# Patient Record
Sex: FEMALE | Race: BLACK OR AFRICAN AMERICAN | NOT HISPANIC OR LATINO | URBAN - METROPOLITAN AREA
[De-identification: names, ages, dates, MRNs, and addresses within clinical notes are randomized per-mention and may not be internally consistent; named-entity substitution may affect disease eponyms.]

---

## 2017-03-08 ENCOUNTER — OUTPATIENT (OUTPATIENT)
Dept: OUTPATIENT SERVICES | Facility: HOSPITAL | Age: 32
LOS: 1 days | End: 2017-03-08

## 2017-03-08 ENCOUNTER — APPOINTMENT (OUTPATIENT)
Dept: RADIOLOGY | Facility: HOSPITAL | Age: 32
End: 2017-03-08

## 2017-03-08 DIAGNOSIS — R76.11 NONSPECIFIC REACTION TO TUBERCULIN SKIN TEST WITHOUT ACTIVE TUBERCULOSIS: ICD-10-CM

## 2017-12-29 ENCOUNTER — EMERGENCY (EMERGENCY)
Facility: HOSPITAL | Age: 32
LOS: 1 days | Discharge: HOME | End: 2017-12-29
Attending: EMERGENCY MEDICINE | Admitting: EMERGENCY MEDICINE
Payer: COMMERCIAL

## 2017-12-29 VITALS
DIASTOLIC BLOOD PRESSURE: 83 MMHG | OXYGEN SATURATION: 100 % | HEART RATE: 90 BPM | TEMPERATURE: 100 F | SYSTOLIC BLOOD PRESSURE: 124 MMHG | RESPIRATION RATE: 16 BRPM

## 2017-12-29 VITALS
HEART RATE: 102 BPM | OXYGEN SATURATION: 100 % | RESPIRATION RATE: 16 BRPM | TEMPERATURE: 99 F | DIASTOLIC BLOOD PRESSURE: 70 MMHG | SYSTOLIC BLOOD PRESSURE: 108 MMHG

## 2017-12-29 LAB
ALBUMIN SERPL ELPH-MCNC: 3.9 G/DL — SIGNIFICANT CHANGE UP (ref 3.3–5)
ALP SERPL-CCNC: 46 U/L — SIGNIFICANT CHANGE UP (ref 40–120)
ALT FLD-CCNC: 17 U/L — SIGNIFICANT CHANGE UP (ref 4–33)
APPEARANCE UR: CLEAR — SIGNIFICANT CHANGE UP
AST SERPL-CCNC: 22 U/L — SIGNIFICANT CHANGE UP (ref 4–32)
BACTERIA # UR AUTO: SIGNIFICANT CHANGE UP
BASOPHILS # BLD AUTO: 0.01 K/UL — SIGNIFICANT CHANGE UP (ref 0–0.2)
BASOPHILS NFR BLD AUTO: 0.2 % — SIGNIFICANT CHANGE UP (ref 0–2)
BILIRUB SERPL-MCNC: 0.3 MG/DL — SIGNIFICANT CHANGE UP (ref 0.2–1.2)
BILIRUB UR-MCNC: NEGATIVE — SIGNIFICANT CHANGE UP
BLOOD UR QL VISUAL: HIGH
BUN SERPL-MCNC: 12 MG/DL — SIGNIFICANT CHANGE UP (ref 7–23)
CALCIUM SERPL-MCNC: 8.2 MG/DL — LOW (ref 8.4–10.5)
CHLORIDE SERPL-SCNC: 103 MMOL/L — SIGNIFICANT CHANGE UP (ref 98–107)
CO2 SERPL-SCNC: 22 MMOL/L — SIGNIFICANT CHANGE UP (ref 22–31)
COLOR SPEC: YELLOW — SIGNIFICANT CHANGE UP
CREAT SERPL-MCNC: 0.64 MG/DL — SIGNIFICANT CHANGE UP (ref 0.5–1.3)
EOSINOPHIL # BLD AUTO: 0.01 K/UL — SIGNIFICANT CHANGE UP (ref 0–0.5)
EOSINOPHIL NFR BLD AUTO: 0.2 % — SIGNIFICANT CHANGE UP (ref 0–6)
GLUCOSE SERPL-MCNC: 103 MG/DL — HIGH (ref 70–99)
GLUCOSE UR-MCNC: NEGATIVE — SIGNIFICANT CHANGE UP
HCT VFR BLD CALC: 40.2 % — SIGNIFICANT CHANGE UP (ref 34.5–45)
HGB BLD-MCNC: 13 G/DL — SIGNIFICANT CHANGE UP (ref 11.5–15.5)
IMM GRANULOCYTES # BLD AUTO: 0.02 # — SIGNIFICANT CHANGE UP
IMM GRANULOCYTES NFR BLD AUTO: 0.3 % — SIGNIFICANT CHANGE UP (ref 0–1.5)
KETONES UR-MCNC: NEGATIVE — SIGNIFICANT CHANGE UP
LEUKOCYTE ESTERASE UR-ACNC: NEGATIVE — SIGNIFICANT CHANGE UP
LYMPHOCYTES # BLD AUTO: 0.81 K/UL — LOW (ref 1–3.3)
LYMPHOCYTES # BLD AUTO: 14.2 % — SIGNIFICANT CHANGE UP (ref 13–44)
MCHC RBC-ENTMCNC: 26.6 PG — LOW (ref 27–34)
MCHC RBC-ENTMCNC: 32.3 % — SIGNIFICANT CHANGE UP (ref 32–36)
MCV RBC AUTO: 82.2 FL — SIGNIFICANT CHANGE UP (ref 80–100)
MONOCYTES # BLD AUTO: 0.63 K/UL — SIGNIFICANT CHANGE UP (ref 0–0.9)
MONOCYTES NFR BLD AUTO: 11 % — SIGNIFICANT CHANGE UP (ref 2–14)
MUCOUS THREADS # UR AUTO: SIGNIFICANT CHANGE UP
NEUTROPHILS # BLD AUTO: 4.24 K/UL — SIGNIFICANT CHANGE UP (ref 1.8–7.4)
NEUTROPHILS NFR BLD AUTO: 74.1 % — SIGNIFICANT CHANGE UP (ref 43–77)
NITRITE UR-MCNC: NEGATIVE — SIGNIFICANT CHANGE UP
NON-SQ EPI CELLS # UR AUTO: <1 — SIGNIFICANT CHANGE UP
NRBC # FLD: 0 — SIGNIFICANT CHANGE UP
PH UR: 6 — SIGNIFICANT CHANGE UP (ref 4.6–8)
PLATELET # BLD AUTO: 203 K/UL — SIGNIFICANT CHANGE UP (ref 150–400)
PMV BLD: 12.2 FL — SIGNIFICANT CHANGE UP (ref 7–13)
POTASSIUM SERPL-MCNC: 4.4 MMOL/L — SIGNIFICANT CHANGE UP (ref 3.5–5.3)
POTASSIUM SERPL-SCNC: 4.4 MMOL/L — SIGNIFICANT CHANGE UP (ref 3.5–5.3)
PROT SERPL-MCNC: 7.1 G/DL — SIGNIFICANT CHANGE UP (ref 6–8.3)
PROT UR-MCNC: 20 MG/DL — SIGNIFICANT CHANGE UP
RBC # BLD: 4.89 M/UL — SIGNIFICANT CHANGE UP (ref 3.8–5.2)
RBC # FLD: 13.8 % — SIGNIFICANT CHANGE UP (ref 10.3–14.5)
RBC CASTS # UR COMP ASSIST: SIGNIFICANT CHANGE UP (ref 0–?)
SODIUM SERPL-SCNC: 139 MMOL/L — SIGNIFICANT CHANGE UP (ref 135–145)
SP GR SPEC: 1.02 — SIGNIFICANT CHANGE UP (ref 1–1.04)
SQUAMOUS # UR AUTO: SIGNIFICANT CHANGE UP
UROBILINOGEN FLD QL: NORMAL MG/DL — SIGNIFICANT CHANGE UP
WBC # BLD: 5.72 K/UL — SIGNIFICANT CHANGE UP (ref 3.8–10.5)
WBC # FLD AUTO: 5.72 K/UL — SIGNIFICANT CHANGE UP (ref 3.8–10.5)
WBC UR QL: SIGNIFICANT CHANGE UP (ref 0–?)

## 2017-12-29 PROCEDURE — 71020: CPT | Mod: 26

## 2017-12-29 PROCEDURE — 99284 EMERGENCY DEPT VISIT MOD MDM: CPT

## 2017-12-29 RX ORDER — KETOROLAC TROMETHAMINE 30 MG/ML
30 SYRINGE (ML) INJECTION ONCE
Qty: 0 | Refills: 0 | Status: DISCONTINUED | OUTPATIENT
Start: 2017-12-29 | End: 2017-12-29

## 2017-12-29 RX ORDER — ONDANSETRON 8 MG/1
4 TABLET, FILM COATED ORAL ONCE
Qty: 0 | Refills: 0 | Status: COMPLETED | OUTPATIENT
Start: 2017-12-29 | End: 2017-12-29

## 2017-12-29 RX ORDER — ACETAMINOPHEN 500 MG
1000 TABLET ORAL ONCE
Qty: 0 | Refills: 0 | Status: COMPLETED | OUTPATIENT
Start: 2017-12-29 | End: 2017-12-29

## 2017-12-29 RX ORDER — SODIUM CHLORIDE 9 MG/ML
1000 INJECTION INTRAMUSCULAR; INTRAVENOUS; SUBCUTANEOUS ONCE
Qty: 0 | Refills: 0 | Status: COMPLETED | OUTPATIENT
Start: 2017-12-29 | End: 2017-12-29

## 2017-12-29 RX ORDER — METOCLOPRAMIDE HCL 10 MG
10 TABLET ORAL ONCE
Qty: 0 | Refills: 0 | Status: COMPLETED | OUTPATIENT
Start: 2017-12-29 | End: 2017-12-29

## 2017-12-29 RX ADMIN — Medication 30 MILLIGRAM(S): at 16:27

## 2017-12-29 RX ADMIN — Medication 10 MILLIGRAM(S): at 18:20

## 2017-12-29 RX ADMIN — ONDANSETRON 4 MILLIGRAM(S): 8 TABLET, FILM COATED ORAL at 16:27

## 2017-12-29 RX ADMIN — SODIUM CHLORIDE 1000 MILLILITER(S): 9 INJECTION INTRAMUSCULAR; INTRAVENOUS; SUBCUTANEOUS at 18:31

## 2017-12-29 RX ADMIN — Medication 400 MILLIGRAM(S): at 18:31

## 2017-12-29 RX ADMIN — SODIUM CHLORIDE 1000 MILLILITER(S): 9 INJECTION INTRAMUSCULAR; INTRAVENOUS; SUBCUTANEOUS at 16:27

## 2017-12-29 NOTE — ED PROVIDER NOTE - PLAN OF CARE
Follow up with your PMD within 48-72 hours.  Rest, increase fluids. Take tylenol 650mg every 6 hours, ibuprofen 600mg every 8hrs for pain or temp greater than 99.9. Worsening or continued fever, chills, weakness, nausea, vomiting, abdominal pain return to ER

## 2017-12-29 NOTE — ED PROVIDER NOTE - OBJECTIVE STATEMENT
Pt is 37 y/o female, obese, with no significant PMhx here for eval of subjective fever, sore throat, cough productive of brown sputum, generalized malaise x 3 days. Also frontal HA and pain to top of head which is typical of HA pt experienced in the past. Pt denies neck pain/stifnness, denies drooling, trismus, saray Pt is 37 y/o female, obese, with no significant PMhx here for eval of subjective fever, sore throat, cough productive of brown sputum, generalized malaise x 3 days. Also frontal HA and pain to top of head which is typical of HA pt experienced in the past. Pt denies neck pain/stifnness, denies drooling, trismus, change in voice, denies cp, sob, abd pain, admits to nausea and few episodes of NB/NB emesis most posttusively. (-) rash, sick contacts or recent travel. no meds talen today. LMP - 2 wks ago; denies urinary sx or possibility of pregnancy.

## 2017-12-29 NOTE — ED PROVIDER NOTE - MEDICAL DECISION MAKING DETAILS
viral syndrome/pharyngitis. Pt well appearing, no meningeal signs, mild posterior pharyngeal erythema anoted, lungs cta, s1s2 appreciated, abd soft, nt/nd - sx tx, ucg, cxr will reasses.

## 2017-12-29 NOTE — ED ADULT NURSE NOTE - OBJECTIVE STATEMENT
32 year old female. A&Ox3, ambulatory c/o fever, chills, cough, dysuria x 3 days, admits to N/V x 1 day. Respirations even, unlabored. IV placed, labs sent, medicated as ordered.

## 2017-12-29 NOTE — ED PROVIDER NOTE - PHYSICAL EXAMINATION
(+) mild posterior pharyngeal erythema with one exudate to Lt tonsil; no tonsillar enlargement, no evidnece of PTA.

## 2017-12-29 NOTE — ED PROVIDER NOTE - CARE PLAN
Principal Discharge DX:	Viral syndrome Principal Discharge DX:	Viral syndrome  Instructions for follow-up, activity and diet:	Follow up with your PMD within 48-72 hours.  Rest, increase fluids. Take tylenol 650mg every 6 hours, ibuprofen 600mg every 8hrs for pain or temp greater than 99.9. Worsening or continued fever, chills, weakness, nausea, vomiting, abdominal pain return to ER

## 2017-12-29 NOTE — ED PROVIDER NOTE - ATTENDING CONTRIBUTION TO CARE
DR. AYALA, ATTENDING MD-  I performed a face to face bedside interview with patient regarding history of present illness, review of symptoms and past medical history. I completed an independent physical exam.  I have discussed patient's plan of care with PA.   Documentation as above in the note.    33 y/o female with cough, muscle aches, headaches, sore throat, nausea, subj fever since last night.  No sick contacts, no recent travel.  HA similar to prior ha's in character.  Urine seems darker and cloudier than usual.  Denies neck stiffness, cp, sob, abd pain, v/d, dysuria, rash.  Appears fatigued, dry mm, mild pharyngeal erythema, no tonsillar exudates, uvula midline, full rom neck, no meningismus, mild photophobia, diminished throughout otherwise ctabil, s1s2 rrr no m/r/g, abd soft non ttp no r/g, no cva tenderness b/l, no leg swelling b/l, no rash.  Likely viral syndrome, consider pna, give ivf, toradol, obtain cxr, cbc, bmp, ucg, ua, ucx, reassess, antic dc with pcp f/u.

## 2017-12-31 LAB
BACTERIA UR CULT: SIGNIFICANT CHANGE UP
SPECIMEN SOURCE: SIGNIFICANT CHANGE UP

## 2018-12-05 NOTE — ED ADULT TRIAGE NOTE - NS ED TRIAGE AVPU SCALE
Alert-The patient is alert, awake and responds to voice. The patient is oriented to time, place, and person. The triage nurse is able to obtain subjective information. Decreased range of motion on left shoulder joint due to pain noted.

## 2020-04-26 ENCOUNTER — MESSAGE (OUTPATIENT)
Age: 35
End: 2020-04-26

## 2020-05-02 LAB
SARS-COV-2 IGG SERPL IA-ACNC: <0.1 INDEX
SARS-COV-2 IGG SERPL QL IA: NEGATIVE

## 2021-06-28 ENCOUNTER — APPOINTMENT (OUTPATIENT)
Dept: BARIATRICS | Facility: CLINIC | Age: 36
End: 2021-06-28
Payer: COMMERCIAL

## 2021-06-28 VITALS — BODY MASS INDEX: 51.16 KG/M2 | HEIGHT: 62 IN | WEIGHT: 278 LBS

## 2021-06-28 DIAGNOSIS — H93.8X3 OTHER SPECIFIED DISORDERS OF EAR, BILATERAL: ICD-10-CM

## 2021-06-28 DIAGNOSIS — Z78.9 OTHER SPECIFIED HEALTH STATUS: ICD-10-CM

## 2021-06-28 DIAGNOSIS — H69.83 OTHER SPECIFIED DISORDERS OF EUSTACHIAN TUBE, BILATERAL: ICD-10-CM

## 2021-06-28 PROCEDURE — 99205 OFFICE O/P NEW HI 60 MIN: CPT | Mod: 95

## 2021-06-28 NOTE — HISTORY OF PRESENT ILLNESS
[Improved Looks/Aesthetics] : Improved looks/aesthetics [Childhood] : childhood [Commerial Program: _____] : commercial program: [unfilled] [Cravings] : cravings [Portions/overeating] : portions/overeating [Having a specific meal plan to follow] : having a specific meal plan to follow [Other: (explain) _____] : [unfilled] [] : Yes [I usually sleep 6-8 hours] : I usually sleep 6-8 hours [Breakfast] : breakfast [Lunch] : lunch [Dinner] : dinner [Sweet] : sweet [5] : How many cups of water do you regularly drink per day: 5 [2] : Cups of coffee per day: 2 [Other: ____] : [unfilled] [0] : 0 [Other Location: e.g. School (Enter Location, City,State)___] : at [unfilled], at the time of the visit. [Other Location: e.g. Home (Enter Location, City,State)___] : at [unfilled] [Family Member] : family member [Verbal consent obtained from patient] : the patient, [unfilled] [FreeTextEntry2] : 278 [FreeTextEntry1] : 36 y/o female, has been struggling with her weight since she was a child \par [place] about our weight loss management program from a friend\par Reports struggling with irregular/ heavy menstrual cycles, not currently on birth control , not currently sexually active. ? 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

## 2021-06-28 NOTE — ASSESSMENT
[FreeTextEntry1] : Lifestyle modification education provided- recommend diet high in lean protein & vegetables, low in simple carbohydrates. Discussed complex carbs that should be incorporated in diet. Water intake goal of 64 ounces daily.\par Referral to RD for additional dietary education.  \par Sleep- recommend 7-8 hours of sleep to help with weight loss\par Exercise: recommend exercising at the gym for 30-45 mins 3-4 times per week. Recommend using the bike or doing chair exercises to decrease incidence of pain in feet\par Medication- pt meets criteria to initiate medication for weight loss. Discuss different types of medications and how they work. Pt to call insurance company to see if she has insurance coverage. \par Ordered fasting lab work to evaluate for any weight related diseases. \par RTO in 2 weeks for frequent follow up and to review results of labs. Scheduled an appt for 7/12 at 10:30 am

## 2021-07-12 ENCOUNTER — APPOINTMENT (OUTPATIENT)
Dept: BARIATRICS | Facility: CLINIC | Age: 36
End: 2021-07-12
Payer: COMMERCIAL

## 2021-07-12 LAB
25(OH)D3 SERPL-MCNC: 25.9 NG/ML
ALBUMIN SERPL ELPH-MCNC: 4.3 G/DL
ALP BLD-CCNC: 50 U/L
ALT SERPL-CCNC: 27 U/L
ANION GAP SERPL CALC-SCNC: 14 MMOL/L
AST SERPL-CCNC: 27 U/L
BASOPHILS # BLD AUTO: 0.02 K/UL
BASOPHILS NFR BLD AUTO: 0.2 %
BILIRUB SERPL-MCNC: 0.3 MG/DL
BUN SERPL-MCNC: 14 MG/DL
CALCIUM SERPL-MCNC: 9.4 MG/DL
CHLORIDE SERPL-SCNC: 105 MMOL/L
CHOLEST SERPL-MCNC: 204 MG/DL
CO2 SERPL-SCNC: 19 MMOL/L
CORTIS SERPL-MCNC: 0.4 UG/DL
CREAT SERPL-MCNC: 0.58 MG/DL
CRP SERPL-MCNC: <3 MG/L
EOSINOPHIL # BLD AUTO: 0.05 K/UL
EOSINOPHIL NFR BLD AUTO: 0.5 %
ESTIMATED AVERAGE GLUCOSE: 114 MG/DL
FSH SERPL-MCNC: 3.1 IU/L
GLUCOSE SERPL-MCNC: 103 MG/DL
HBA1C MFR BLD HPLC: 5.6 %
HCT VFR BLD CALC: 40.5 %
HDLC SERPL-MCNC: 72 MG/DL
HGB BLD-MCNC: 13.2 G/DL
IMM GRANULOCYTES NFR BLD AUTO: 0.4 %
INSULIN P FAST SERPL-ACNC: 40.2 UU/ML
LDLC SERPL CALC-MCNC: 118 MG/DL
LH SERPL-ACNC: 6.2 IU/L
LYMPHOCYTES # BLD AUTO: 1.87 K/UL
LYMPHOCYTES NFR BLD AUTO: 19.5 %
MAN DIFF?: NORMAL
MCHC RBC-ENTMCNC: 27.3 PG
MCHC RBC-ENTMCNC: 32.6 GM/DL
MCV RBC AUTO: 83.7 FL
MONOCYTES # BLD AUTO: 0.41 K/UL
MONOCYTES NFR BLD AUTO: 4.3 %
NEUTROPHILS # BLD AUTO: 7.21 K/UL
NEUTROPHILS NFR BLD AUTO: 75.1 %
NONHDLC SERPL-MCNC: 131 MG/DL
PLATELET # BLD AUTO: 294 K/UL
POTASSIUM SERPL-SCNC: 4.3 MMOL/L
PROT SERPL-MCNC: 6.8 G/DL
RBC # BLD: 4.84 M/UL
RBC # FLD: 14.3 %
SODIUM SERPL-SCNC: 138 MMOL/L
T3 SERPL-MCNC: 78 NG/DL
T4 FREE SERPL-MCNC: 1 NG/DL
TESTOST BND SERPL-MCNC: 1.2 PG/ML
TESTOSTERONE TOTAL S: 19 NG/DL
TRIGL SERPL-MCNC: 69 MG/DL
TSH SERPL-ACNC: 1.31 UIU/ML
URATE SERPL-MCNC: 5.9 MG/DL
WBC # FLD AUTO: 9.6 K/UL

## 2021-07-12 PROCEDURE — 99214 OFFICE O/P EST MOD 30 MIN: CPT | Mod: 95

## 2021-07-12 RX ORDER — DEXAMETHASONE 1 MG/1
1 TABLET ORAL
Qty: 1 | Refills: 0 | Status: DISCONTINUED | COMMUNITY
Start: 2021-06-28 | End: 2021-07-12

## 2021-07-12 NOTE — ASSESSMENT
[FreeTextEntry1] : Dietary modifcation education reviewed. Recommend low fat diet, discussed healthy fats vs unhealthy fats. Avoid fried foods, red meat, pizza, etc. Recommend substituting complex carbs instead of simple carbs. \par Recommend increasing exercise regimen to 3 times per week \par Discussed option to treat obesity with Contrave. Pt denies contraindications to contrave including: glaucoma, uncontrolled hypertension, seizure disorder, opiod/ETOH use, MAOI use, anorexia/bulimia, CHF, MI, Kidney/liver disease, & allergy to contrave. Reviewed common side effects including: N/C/V/D, headache, dizziness, insomnia, and dry mouth. Reviewed possible adverse effects including: seizures, risk of opiod overdose, sudden allergic reactions, elevated blood pressure or heart rate, hepatitis, manic episodes, angle closure glaucoma, hypoglycemia if taken with insulin or sulfonylureas. Discussed titration schedule for contrave: start with 1 pill daily & increase by 1 pill weekly until you reach maximum dose of 4 tabs daily. Instructed to avoid taking with high fat meals. Will send in via mail order to [facility] since the patient is paying out of pocket. \par RTO in 2 weeks - 8/2 at 10:30 am \par

## 2021-07-12 NOTE — HISTORY OF PRESENT ILLNESS
[Home] : at home, [unfilled] , at the time of the visit. [Other Location: e.g. Home (Enter Location, City,State)___] : at [unfilled] [Verbal consent obtained from patient] : the patient, [unfilled] [FreeTextEntry1] : 36 y/o female, has been struggling with her weight since she was a child \par [place] about our weight loss management program from a friend\par Reports struggling with irregular/ heavy menstrual cycles, not currently on birth control , not currently sexually active. ? 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 Reviewed lab results- elevated cholesterol, elevated LDL, elevated fasting insulin, HgBa1c 5.6%, Vit D slightly low. Called insurance- does not have coverage for weight loss medication. Pt is interested in self paying for contrave to help with cravings. Exercised at the gym 2 times last week on the bike x 30 mins.

## 2021-08-02 ENCOUNTER — APPOINTMENT (OUTPATIENT)
Dept: BARIATRICS | Facility: CLINIC | Age: 36
End: 2021-08-02

## 2021-08-26 ENCOUNTER — APPOINTMENT (OUTPATIENT)
Dept: BARIATRICS | Facility: CLINIC | Age: 36
End: 2021-08-26
Payer: COMMERCIAL

## 2021-08-26 VITALS — HEIGHT: 62 IN | WEIGHT: 267.5 LBS | BODY MASS INDEX: 49.23 KG/M2

## 2021-08-26 PROCEDURE — 99214 OFFICE O/P EST MOD 30 MIN: CPT | Mod: 95

## 2021-08-26 NOTE — HISTORY OF PRESENT ILLNESS
[Home] : at home, [unfilled] , at the time of the visit. [Other Location: e.g. Home (Enter Location, City,State)___] : at [unfilled] [Verbal consent obtained from patient] : the patient, [unfilled] [] : 2. Do you feel distressed about your episodes of excessive overeating? Yes [Always] : 6. Within the past 3 months, during your episodes of excessive overeating, how often did you feel disgusted with yourself or guilty afterward? Always [Never or Rarely] : 7. Within the past 3 months, during your episodes of excessive overeating, how often did you make yourself vomit as a means to control your weight or shape? Never or Rarely [FreeTextEntry1] : 36 y/o female, has been struggling with her weight since she was a child \par [place] about our weight loss management program from a friend\par Reports struggling with irregular/ heavy menstrual cycles, not currently on birth control , not currently sexually active. ? 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 Reviewed lab results- elevated cholesterol, elevated LDL, elevated fasting insulin, HgBa1c 5.6%, Vit D slightly low. Called insurance- does not have coverage for weight loss medication. Pt is interested in self paying for contrave to help with cravings. Exercised at the gym 2 times last week on the bike x 30 mins.\par \par 8/26/21: Lost 10.5 lbs since starting the program. Dietary intake improved. Food Recall: B- oatmeal, L- salad with protein, D- rice, beans and vegetables. Taking contrave 4 tabs daily, reports that she skips on days that she is working, reports having side effects including nausea, constipation, insomnia and occassional vomiting. Reports 2-3 binge eating episodes throughout the week on days that she is working and doesn't take the medication. Binge eating episodes happen on high stress days. Not currently seeing a therapist. Exercises at the gym 1-3 times per week. Water intake adequate. Sleep inadequate.

## 2021-08-26 NOTE — ASSESSMENT
[FreeTextEntry1] : Dietary Modification- recommend changing to brown rice instead of white rice or decreasing the days that she eats white rice. Continue diet high in vegetables & lean protein intake. Recommend decreasing access to sweat treats at home to prevent excessive eating of sweats when she is stressed. \par Physical Activity/Exercise- recommend exercising 3 times per week for 45 mins. Enjoys dancing, may benefit from signing up for a gym with classes\par Mental Health- recommend calling therapist to re-initiate treatment to help with emotional eating/ coping strategies.\par Insomnia- recommend meditation & deep breathing at night\par Medication-recommend titrating off contrave, decrease to 2 tabs daily over the next week and then down to 1 tab daily the following week and then come off completely. May benefit from treatment with vyvanse to treat BED.\par RTO in 3-4 weeks- scheduled an appt for 9/23 at 2 pm \par

## 2021-09-23 ENCOUNTER — APPOINTMENT (OUTPATIENT)
Dept: BARIATRICS | Facility: CLINIC | Age: 36
End: 2021-09-23
Payer: COMMERCIAL

## 2021-09-23 VITALS — BODY MASS INDEX: 49.69 KG/M2 | HEIGHT: 62 IN | WEIGHT: 270 LBS

## 2021-09-23 DIAGNOSIS — Z00.00 ENCOUNTER FOR GENERAL ADULT MEDICAL EXAMINATION W/OUT ABNORMAL FINDINGS: ICD-10-CM

## 2021-09-23 PROCEDURE — 99214 OFFICE O/P EST MOD 30 MIN: CPT | Mod: 95

## 2021-09-23 NOTE — ASSESSMENT
[FreeTextEntry1] : Dietary Modification- recommend meal planning for work days and to pack her own lunch to avoid periods of feeling hungry and to avoid eating available snacks. Given examples of healthy meals to pack for lunch. recommend mindful eating, trying distraction techniques to avoid binge eating episodes.\par Mental Health- recommend calling therapist to re-initiate therapy session to assist with coping mechanisms\par Exercise- continue current exercise regimen\par Medication- meets requirement to start medication treatment of obesity & BED. Will start on vyvanse daily. Reviewed side effects- headaches, increased anxiety, increased HR & BP, insomnia, n/v/c/d. Recommend she checks BP & HR weekly. \par RTO in 2 weeks- 1month \par \par

## 2021-09-23 NOTE — HISTORY OF PRESENT ILLNESS
[Home] : at home, [unfilled] , at the time of the visit. [Other Location: e.g. Home (Enter Location, City,State)___] : at [unfilled] [Verbal consent obtained from patient] : the patient, [unfilled] [FreeTextEntry1] : 34 y/o female, has been struggling with her weight since she was a child \par [place] about our weight loss management program from a friend\par Reports struggling with irregular/ heavy menstrual cycles, not currently on birth control , not currently sexually active. ? 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 Reviewed lab results- elevated cholesterol, elevated LDL, elevated fasting insulin, HgBa1c 5.6%, Vit D slightly low. Called insurance- does not have coverage for weight loss medication. Pt is interested in self paying for contrave to help with cravings. Exercised at the gym 2 times last week on the bike x 30 mins.\par \par 8/26/21: Lost 10.5 lbs since starting the program. Dietary intake improved. Food Recall: B- oatmeal, L- salad with protein, D- rice, beans and vegetables. Taking contrave 4 tabs daily, reports that she skips on days that she is working, reports having side effects including nausea, constipation, insomnia and occassional vomiting. Reports 2-3 binge eating episodes throughout the week on days that she is working and doesn't take the medication. Binge eating episodes happen on high stress days. Not currently seeing a therapist. Exercises at the gym 1-3 times per week. Water intake adequate. Sleep inadequate. \par \par 9/23/21: Gained 3 lbs since tapering off of contrave. Reports resolution of side effects including-nausea,constipation and insomnia. Has been sleeping better. Water intake adequate. Able to eat well on days off- ex: B-oatmeal, L-yogurt, D-protein with vegetable. Has binge eating episodes 3-4 times per week on days that she is working- usually binges on sweets. Exercising 3-4 times per week- youtube vidoes, yoga & pilated. Denies calling therapist.

## 2021-09-28 RX ORDER — LISDEXAMFETAMINE DIMESYLATE 30 MG/1
30 CAPSULE ORAL
Qty: 30 | Refills: 0 | Status: DISCONTINUED | COMMUNITY
Start: 2021-09-23 | End: 2021-09-28

## 2022-08-19 NOTE — ED PROVIDER NOTE - RESPIRATORY DISTRESS
SURVEY:    You may be receiving a survey from [employer] regarding your visit today. Please complete the survey to enable us to provide the highest quality of care to you and your family. If you cannot score us a very good (5 Stars) on any question, please call the office to discuss how we could have made your experience a very good one. Thank you.     Clinical Care Team: Oleg Nunez, MELODY-YASSINE Whitley LPN    Clerical Team: Cintia Serrato
no

## 2023-03-07 ENCOUNTER — APPOINTMENT (OUTPATIENT)
Dept: BARIATRICS/WEIGHT MGMT | Facility: CLINIC | Age: 38
End: 2023-03-07
Payer: COMMERCIAL

## 2023-03-07 VITALS — WEIGHT: 293 LBS | BODY MASS INDEX: 53.59 KG/M2

## 2023-03-07 VITALS — SYSTOLIC BLOOD PRESSURE: 110 MMHG | DIASTOLIC BLOOD PRESSURE: 70 MMHG

## 2023-03-07 PROCEDURE — 99214 OFFICE O/P EST MOD 30 MIN: CPT

## 2023-03-07 RX ORDER — LISDEXAMFETAMINE DIMESYLATE 30 MG/1
30 CAPSULE ORAL
Qty: 30 | Refills: 0 | Status: DISCONTINUED | COMMUNITY
Start: 2021-09-28 | End: 2023-03-07

## 2023-03-07 RX ORDER — NALTREXONE HYDROCHLORIDE AND BUPROPION HYDROCHLORIDE 8; 90 MG/1; MG/1
8-90 TABLET, EXTENDED RELEASE ORAL
Qty: 120 | Refills: 2 | Status: DISCONTINUED | COMMUNITY
Start: 2021-07-12 | End: 2023-03-07

## 2023-03-07 NOTE — HISTORY OF PRESENT ILLNESS
[FreeTextEntry1] : 34 y/o female, has been struggling with her weight since she was a child \par [place] about our weight loss management program from a friend\par Reports struggling with irregular/ heavy menstrual cycles, not currently on birth control , not currently sexually active. ? 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 Reviewed lab results- elevated cholesterol, elevated LDL, elevated fasting insulin, HgBa1c 5.6%, Vit D slightly low. Called insurance- does not have coverage for weight loss medication. Pt is interested in self paying for contrave to help with cravings. Exercised at the gym 2 times last week on the bike x 30 mins.\par \par 8/26/21: Lost 10.5 lbs since starting the program. Dietary intake improved. Food Recall: B- oatmeal, L- salad with protein, D- rice, beans and vegetables. Taking contrave 4 tabs daily, reports that she skips on days that she is working, reports having side effects including nausea, constipation, insomnia and occassional vomiting. Reports 2-3 binge eating episodes throughout the week on days that she is working and doesn't take the medication. Binge eating episodes happen on high stress days. Not currently seeing a therapist. Exercises at the gym 1-3 times per week. Water intake adequate. Sleep inadequate. \par \par 9/23/21: Gained 3 lbs since tapering off of contrave. Reports resolution of side effects including-nausea,constipation and insomnia. Has been sleeping better. Water intake adequate. Able to eat well on days off- ex: B-oatmeal, L-yogurt, D-protein with vegetable. Has binge eating episodes 3-4 times per week on days that she is working- usually binges on sweets. Exercising 3-4 times per week- youtube vidoes, yoga & pilated. Denies calling therapist.  \par \par 3/7/23: Gained weight since we last spoke, highest weight 305, has lost 10 lbs from dietary changes in the last 2 weeks. Continues to struggle with binge eating episodes due to emotional stress. Tried vyvanse and said it didn’t help.

## 2023-03-07 NOTE — HISTORY OF PRESENT ILLNESS
[FreeTextEntry1] : 36 y/o female, has been struggling with her weight since she was a child \par [place] about our weight loss management program from a friend\par Reports struggling with irregular/ heavy menstrual cycles, not currently on birth control , not currently sexually active. ? 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 Reviewed lab results- elevated cholesterol, elevated LDL, elevated fasting insulin, HgBa1c 5.6%, Vit D slightly low. Called insurance- does not have coverage for weight loss medication. Pt is interested in self paying for contrave to help with cravings. Exercised at the gym 2 times last week on the bike x 30 mins.\par \par 8/26/21: Lost 10.5 lbs since starting the program. Dietary intake improved. Food Recall: B- oatmeal, L- salad with protein, D- rice, beans and vegetables. Taking contrave 4 tabs daily, reports that she skips on days that she is working, reports having side effects including nausea, constipation, insomnia and occassional vomiting. Reports 2-3 binge eating episodes throughout the week on days that she is working and doesn't take the medication. Binge eating episodes happen on high stress days. Not currently seeing a therapist. Exercises at the gym 1-3 times per week. Water intake adequate. Sleep inadequate. \par \par 9/23/21: Gained 3 lbs since tapering off of contrave. Reports resolution of side effects including-nausea,constipation and insomnia. Has been sleeping better. Water intake adequate. Able to eat well on days off- ex: B-oatmeal, L-yogurt, D-protein with vegetable. Has binge eating episodes 3-4 times per week on days that she is working- usually binges on sweets. Exercising 3-4 times per week- youtube vidoes, yoga & pilated. Denies calling therapist.  \par \par 3/7/23: Gained weight since we last spoke, highest weight 305, has lost 10 lbs from dietary changes in the last 2 weeks. Continues to struggle with binge eating episodes due to emotional stress. Tried vyvanse and said it didn’t help.

## 2023-03-07 NOTE — ASSESSMENT
[FreeTextEntry1] : Continue to focus on dietary modification \par Discussed the importance of focusing on the emotional eating component. Rec she see a therapist to help with this. Open to seeing someone new. Sent info for Dr. Fischer\par Medication- would recommend wegovy, but need updated labs before prescribing medication\par Labs- ordered, to be done before our next apt \par RTO in 3 weeks to review labs and to do a [employer] test at that time\par \par

## 2023-03-30 ENCOUNTER — APPOINTMENT (OUTPATIENT)
Dept: BARIATRICS/WEIGHT MGMT | Facility: CLINIC | Age: 38
End: 2023-03-30
Payer: COMMERCIAL

## 2023-03-30 VITALS — WEIGHT: 292 LBS | BODY MASS INDEX: 53.41 KG/M2

## 2023-03-30 PROCEDURE — 99214 OFFICE O/P EST MOD 30 MIN: CPT

## 2023-03-30 NOTE — ASSESSMENT
[FreeTextEntry1] : Continue to focus on dietary modification- continue to focus on eating healthier foods  \par Discussed the importance of focusing on the emotional eating component. Rec she see a therapist to help with this. Open to seeing someone new. Given info for Dr. Fischer and Phoebe Lamb \par Medication- would recommend wegovy\par Discussed the option to treat obesity with Saxenda or wegovy. Patient denies contraindications to taking: denies family hx or personal history of medullary thyroid carcinoma or MEN 2, denies history of pancreatitis/gallbladder disease/hypoglycemia/renal impairment/ suicidal ideation.  Discussed mechanism of action- works like GLP-1 by regulating appetite/ reducing hunger. Reviewed side effects including: N/V/D/C, injection site reactions, headaches, low blood sugar, dizziness, abdominal pain, and fatigue. Reviewed ways to decrease nausea including: eating bland/lowfat foods, eating foods that contain water, and avoiding lying flat after eating. Discussed injection education & titration schedule.\par Labs-  reviewed with pt \par RTO 1 month \par \par

## 2023-03-30 NOTE — HISTORY OF PRESENT ILLNESS
[FreeTextEntry1] : 34 y/o female, has been struggling with her weight since she was a child \par [place] about our weight loss management program from a friend\par Reports struggling with irregular/ heavy menstrual cycles, not currently on birth control , not currently sexually active. ? 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 Reviewed lab results- elevated cholesterol, elevated LDL, elevated fasting insulin, HgBa1c 5.6%, Vit D slightly low. Called insurance- does not have coverage for weight loss medication. Pt is interested in self paying for contrave to help with cravings. Exercised at the gym 2 times last week on the bike x 30 mins.\par \par 8/26/21: Lost 10.5 lbs since starting the program. Dietary intake improved. Food Recall: B- oatmeal, L- salad with protein, D- rice, beans and vegetables. Taking contrave 4 tabs daily, reports that she skips on days that she is working, reports having side effects including nausea, constipation, insomnia and occasional vomiting. Reports 2-3 binge eating episodes throughout the week on days that she is working and doesn't take the medication. Binge eating episodes happen on high stress days. Not currently seeing a therapist. Exercises at the gym 1-3 times per week. Water intake adequate. Sleep inadequate. \par \par 9/23/21: Gained 3 lbs since tapering off of contrave. Reports resolution of side effects including-nausea,constipation and insomnia. Has been sleeping better. Water intake adequate. Able to eat well on days off- ex: B-oatmeal, L-yogurt, D-protein with vegetable. Has binge eating episodes 3-4 times per week on days that she is working- usually binges on sweets. Exercising 3-4 times per week- youtube videos, yoga & pilates. Denies calling therapist.  \par \par 3/7/23: Gained weight since we last spoke, highest weight 305, has lost 10 lbs from dietary changes in the last 2 weeks. Continues to struggle with binge eating episodes due to emotional stress. Tried vyvanse and said it didn’t help. \par \par 3/30/23: Has been overeating healthier foods . Continues to struggle with extreme hunger, denies feeling full after she finishes a meal, and has binge eating episodes due to emotional stress. Reports that she has struggled with obesity & hunger since she was a child.

## 2023-04-07 ENCOUNTER — APPOINTMENT (OUTPATIENT)
Dept: SURGERY | Facility: CLINIC | Age: 38
End: 2023-04-07
Payer: COMMERCIAL

## 2023-04-07 VITALS
RESPIRATION RATE: 17 BRPM | HEIGHT: 62 IN | DIASTOLIC BLOOD PRESSURE: 83 MMHG | WEIGHT: 286 LBS | HEART RATE: 83 BPM | SYSTOLIC BLOOD PRESSURE: 124 MMHG | TEMPERATURE: 97.2 F | OXYGEN SATURATION: 98 % | BODY MASS INDEX: 52.63 KG/M2

## 2023-04-07 PROCEDURE — 99205 OFFICE O/P NEW HI 60 MIN: CPT

## 2023-04-07 RX ORDER — SEMAGLUTIDE 0.5 MG/.5ML
0.5 INJECTION, SOLUTION SUBCUTANEOUS
Qty: 1 | Refills: 1 | Status: DISCONTINUED | COMMUNITY
Start: 2023-03-30 | End: 2023-04-07

## 2023-04-07 NOTE — ASSESSMENT
[FreeTextEntry1] : 37-year-old female 5 foot 2 286 pounds with a BMI of 52.3 would like to be considered for sleeve gastrectomy I believe she would be a an excellent candidate she will undergo her usual medical nutritional and psychological work-up attend a preoperative support group meeting and return for second office visit once all these are completed the risk benefits and expectations were discussed at length with the patient all of her questions were answered

## 2023-04-07 NOTE — HISTORY OF PRESENT ILLNESS
[de-identified] : Caity is a 37 year old female who presents for initial bariatric surgery consultation.  37-year-old female 5 foot 2 286 pounds with a BMI of 52.3 she has been heavy most of her adult life she has been on multiple diet programs in the past losing up to 50 pounds at any 1 time always gaining that weight back she is looking for more permanent solution to her weight loss problem she is interested in a sleeve gastrectomy she denies reflux she is or previous surgeries.

## 2023-04-25 ENCOUNTER — APPOINTMENT (OUTPATIENT)
Dept: BARIATRICS | Facility: CLINIC | Age: 38
End: 2023-04-25

## 2023-05-01 ENCOUNTER — APPOINTMENT (OUTPATIENT)
Dept: SURGERY | Facility: CLINIC | Age: 38
End: 2023-05-01
Payer: COMMERCIAL

## 2023-05-01 ENCOUNTER — APPOINTMENT (OUTPATIENT)
Dept: BARIATRICS/WEIGHT MGMT | Facility: CLINIC | Age: 38
End: 2023-05-01
Payer: COMMERCIAL

## 2023-05-01 VITALS — BODY MASS INDEX: 53 KG/M2 | WEIGHT: 288 LBS | HEIGHT: 62 IN

## 2023-05-01 VITALS — WEIGHT: 288.3 LBS | BODY MASS INDEX: 52.73 KG/M2

## 2023-05-01 PROCEDURE — 99213 OFFICE O/P EST LOW 20 MIN: CPT | Mod: 95

## 2023-05-01 PROCEDURE — 99214 OFFICE O/P EST MOD 30 MIN: CPT | Mod: 95

## 2023-05-01 RX ORDER — SEMAGLUTIDE 0.25 MG/.5ML
0.25 INJECTION, SOLUTION SUBCUTANEOUS
Qty: 1 | Refills: 0 | Status: DISCONTINUED | COMMUNITY
Start: 2023-03-30 | End: 2023-05-01

## 2023-05-01 NOTE — HISTORY OF PRESENT ILLNESS
[Home] : at home, [unfilled] , at the time of the visit. [Medical Office: ([facility])___] : at the medical office located in  [Verbal consent obtained from patient] : the patient, [unfilled] [de-identified] : Caity is a 37-year-old female who presents for first weigh-in status post initial bariatric surgery consultation on April 7, 2023.

## 2023-05-01 NOTE — ASSESSMENT
[FreeTextEntry1] : Dietary- discussed healthy meal options and ways to meal prep throughout the week. \par Exercise- continue to see  as it keeps her accountable\par Medication- inc dose to wegovy 0.5 mg weekly \par RTO in 3-4 weeks.

## 2023-05-01 NOTE — HISTORY OF PRESENT ILLNESS
[Home] : at home, [unfilled] , at the time of the visit. [Other Location: e.g. Home (Enter Location, City,State)___] : at [unfilled] [Verbal consent obtained from patient] : the patient, [unfilled] [FreeTextEntry1] : 34 y/o female, has been struggling with her weight since she was a child \par [place] about our weight loss management program from a friend\par Reports struggling with irregular/ heavy menstrual cycles, not currently on birth control , not currently sexually active. ? 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 Reviewed lab results- elevated cholesterol, elevated LDL, elevated fasting insulin, HgBa1c 5.6%, Vit D slightly low. Called insurance- does not have coverage for weight loss medication. Pt is interested in self paying for contrave to help with cravings. Exercised at the gym 2 times last week on the bike x 30 mins.\par \par 8/26/21: Lost 10.5 lbs since starting the program. Dietary intake improved. Food Recall: B- oatmeal, L- salad with protein, D- rice, beans and vegetables. Taking contrave 4 tabs daily, reports that she skips on days that she is working, reports having side effects including nausea, constipation, insomnia and occasional vomiting. Reports 2-3 binge eating episodes throughout the week on days that she is working and doesn't take the medication. Binge eating episodes happen on high stress days. Not currently seeing a therapist. Exercises at the gym 1-3 times per week. Water intake adequate. Sleep inadequate. \par \par 9/23/21: Gained 3 lbs since tapering off of contrave. Reports resolution of side effects including-nausea,constipation and insomnia. Has been sleeping better. Water intake adequate. Able to eat well on days off- ex: B-oatmeal, L-yogurt, D-protein with vegetable. Has binge eating episodes 3-4 times per week on days that she is working- usually binges on sweets. Exercising 3-4 times per week- youtube videos, yoga & pilates. Denies calling therapist.  \par \par 3/7/23: Gained weight since we last spoke, highest weight 305, has lost 10 lbs from dietary changes in the last 2 weeks. Continues to struggle with binge eating episodes due to emotional stress. Tried vyvanse and said it didn’t help. \par \par 3/30/23: Has been overeating healthier foods . Continues to struggle with extreme hunger, denies feeling full after she finishes a meal, and has binge eating episodes due to emotional stress. Reports that she has struggled with obesity & hunger since she was a child. \par \par 5/1/23: Maintained weight. Tolerated wegovy 0.25 mg weekly. Ordering food out often- greek salad with grilled chicken,  or [place] food- small portion of white rice. Water intake- drinks a lot of tea and gets about 1-2L per day. Exercise- sees  2-3 times per week.

## 2023-05-22 ENCOUNTER — APPOINTMENT (OUTPATIENT)
Dept: BARIATRICS/WEIGHT MGMT | Facility: CLINIC | Age: 38
End: 2023-05-22
Payer: COMMERCIAL

## 2023-05-22 VITALS — BODY MASS INDEX: 51.53 KG/M2 | WEIGHT: 280 LBS | HEIGHT: 62 IN

## 2023-05-22 PROCEDURE — 99212 OFFICE O/P EST SF 10 MIN: CPT | Mod: 95

## 2023-05-22 NOTE — HISTORY OF PRESENT ILLNESS
[Home] : at home, [unfilled] , at the time of the visit. [Other Location: e.g. Home (Enter Location, City,State)___] : at [unfilled] [Verbal consent obtained from patient] : the patient, [unfilled] [FreeTextEntry1] : 36 y/o female, has been struggling with her weight since she was a child \par [place] about our weight loss management program from a friend\par Reports struggling with irregular/ heavy menstrual cycles, not currently on birth control , not currently sexually active. ? 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 Reviewed lab results- elevated cholesterol, elevated LDL, elevated fasting insulin, HgBa1c 5.6%, Vit D slightly low. Called insurance- does not have coverage for weight loss medication. Pt is interested in self paying for contrave to help with cravings. Exercised at the gym 2 times last week on the bike x 30 mins.\par \par 8/26/21: Lost 10.5 lbs since starting the program. Dietary intake improved. Food Recall: B- oatmeal, L- salad with protein, D- rice, beans and vegetables. Taking contrave 4 tabs daily, reports that she skips on days that she is working, reports having side effects including nausea, constipation, insomnia and occasional vomiting. Reports 2-3 binge eating episodes throughout the week on days that she is working and doesn't take the medication. Binge eating episodes happen on high stress days. Not currently seeing a therapist. Exercises at the gym 1-3 times per week. Water intake adequate. Sleep inadequate. \par \par 9/23/21: Gained 3 lbs since tapering off of contrave. Reports resolution of side effects including-nausea,constipation and insomnia. Has been sleeping better. Water intake adequate. Able to eat well on days off- ex: B-oatmeal, L-yogurt, D-protein with vegetable. Has binge eating episodes 3-4 times per week on days that she is working- usually binges on sweets. Exercising 3-4 times per week- youtube videos, yoga & pilates. Denies calling therapist.  \par \par 3/7/23: Gained weight since we last spoke, highest weight 305, has lost 10 lbs from dietary changes in the last 2 weeks. Continues to struggle with binge eating episodes due to emotional stress. Tried vyvanse and said it didn’t help. \par \par 3/30/23: Has been overeating healthier foods . Continues to struggle with extreme hunger, denies feeling full after she finishes a meal, and has binge eating episodes due to emotional stress. Reports that she has struggled with obesity & hunger since she was a child. \par \par 5/1/23: Maintained weight. Tolerated wegovy 0.25 mg weekly. Ordering food out often- greek salad with grilled chicken,  or [place] food- small portion of white rice. Water intake- drinks a lot of tea and gets about 1-2L per day. Exercise- sees  2-3 times per week. \par \par 5/22/23: Lost 8 lbs. Taking 0.5 mg weekly, feels like it is starting to work better at this dose. Feels full faster. Had a death in the family so meal planning was off track, but getting back into a routine this week. Continues to see  2-3 times per week for exercise.

## 2023-05-22 NOTE — ASSESSMENT
[FreeTextEntry1] : Continue to focus on dietary modification \par Discussed the importance of focusing on the emotional eating component. Has to reschedule a therapy session\par Medication- sent RX for 1 mg dose \par Labs- recheck in July \par RTO in 1 month- 6/26 at 11 \par \par

## 2023-06-26 ENCOUNTER — APPOINTMENT (OUTPATIENT)
Dept: BARIATRICS/WEIGHT MGMT | Facility: CLINIC | Age: 38
End: 2023-06-26
Payer: COMMERCIAL

## 2023-06-26 PROCEDURE — 99212 OFFICE O/P EST SF 10 MIN: CPT | Mod: 95

## 2023-06-26 NOTE — ASSESSMENT
[FreeTextEntry1] : Lifestyle- will refocus on meal planning and exercising when father's health improves and less stress \par Medication- inc dose to wegovy 1.7 mg weekly \par RTO in 1 month for check in- schedule a f/u apt on 7/24 at 11

## 2023-07-24 ENCOUNTER — APPOINTMENT (OUTPATIENT)
Dept: SURGERY | Facility: HOSPITAL | Age: 38
End: 2023-07-24

## 2023-07-24 ENCOUNTER — APPOINTMENT (OUTPATIENT)
Dept: BARIATRICS/WEIGHT MGMT | Facility: CLINIC | Age: 38
End: 2023-07-24

## 2023-07-24 VITALS — BODY MASS INDEX: 49.55 KG/M2 | WEIGHT: 270.9 LBS

## 2023-07-24 NOTE — ASSESSMENT
[FreeTextEntry1] : Continue to focus on dietary modification- try to refocus on meal planning. Recommend increasing dietary fiber and getting adequate water intake daily. \par Can take bowel regimen to help as well with constipation\par Medication- inc dose to wegovy 2.4 mg weekly\par RTO in 1 month\par \par

## 2023-07-24 NOTE — HISTORY OF PRESENT ILLNESS
[Other Location: e.g. School (Enter Location, City,State)___] : at [unfilled], at the time of the visit. [Other Location: e.g. Home (Enter Location, City,State)___] : at [unfilled] [FreeTextEntry1] : 34 y/o female, has been struggling with her weight since she was a child \par [place] about our weight loss management program from a friend\par Reports struggling with irregular/ heavy menstrual cycles, not currently on birth control , not currently sexually active. ? 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 Reviewed lab results- elevated cholesterol, elevated LDL, elevated fasting insulin, HgBa1c 5.6%, Vit D slightly low. Called insurance- does not have coverage for weight loss medication. Pt is interested in self paying for contrave to help with cravings. Exercised at the gym 2 times last week on the bike x 30 mins.\par \par 8/26/21: Lost 10.5 lbs since starting the program. Dietary intake improved. Food Recall: B- oatmeal, L- salad with protein, D- rice, beans and vegetables. Taking contrave 4 tabs daily, reports that she skips on days that she is working, reports having side effects including nausea, constipation, insomnia and occasional vomiting. Reports 2-3 binge eating episodes throughout the week on days that she is working and doesn't take the medication. Binge eating episodes happen on high stress days. Not currently seeing a therapist. Exercises at the gym 1-3 times per week. Water intake adequate. Sleep inadequate. \par \par 9/23/21: Gained 3 lbs since tapering off of contrave. Reports resolution of side effects including-nausea,constipation and insomnia. Has been sleeping better. Water intake adequate. Able to eat well on days off- ex: B-oatmeal, L-yogurt, D-protein with vegetable. Has binge eating episodes 3-4 times per week on days that she is working- usually binges on sweets. Exercising 3-4 times per week- youtube videos, yoga & pilates. Denies calling therapist.  \par \par 3/7/23: Gained weight since we last spoke, highest weight 305, has lost 10 lbs from dietary changes in the last 2 weeks. Continues to struggle with binge eating episodes due to emotional stress. Tried vyvanse and said it didn’t help. \par \par 3/30/23: Has been overeating healthier foods . Continues to struggle with extreme hunger, denies feeling full after she finishes a meal, and has binge eating episodes due to emotional stress. Reports that she has struggled with obesity & hunger since she was a child. \par \par 5/1/23: Maintained weight. Tolerated wegovy 0.25 mg weekly. Ordering food out often- greek salad with grilled chicken,  or [place] food- small portion of white rice. Water intake- drinks a lot of tea and gets about 1-2L per day. Exercise- sees  2-3 times per week. \par \par 5/22/23: Lost 8 lbs. Taking 0.5 mg weekly, feels like it is starting to work better at this dose. Feels full faster. Had a death in the family so meal planning was off track, but getting back into a routine this week. Continues to see  2-3 times per week for exercise. \par \par 6/26/23:  Maintained weight. Taking wegovy 1 mg dose weekly, denies side effects. Hasn't been consistent with eating or exercising since her father is currently in the hospital/ sick. \par \par 7/24/23: Requested a telephone call since she is currently at Rehab with her dad. Out of state session done today only.Reports that she spends day at work, hospital or rehab. Hasn't been able to focus on eating well or exercise since she is eating take out most days. Tolerating wegovy 1.7 mg weekly. Ready for dose titration up to 2.4 mg. Plans on getting back on track with exercise and meal planning once her dad gets out of rehab- may be discharged at the end of this week. + Constipation- taking miralax

## 2023-08-21 ENCOUNTER — APPOINTMENT (OUTPATIENT)
Dept: BARIATRICS/WEIGHT MGMT | Facility: CLINIC | Age: 38
End: 2023-08-21
Payer: COMMERCIAL

## 2023-08-21 VITALS — WEIGHT: 268 LBS | BODY MASS INDEX: 49.32 KG/M2 | HEIGHT: 62 IN

## 2023-08-21 PROCEDURE — 99212 OFFICE O/P EST SF 10 MIN: CPT | Mod: 95

## 2023-08-21 NOTE — HISTORY OF PRESENT ILLNESS
[FreeTextEntry1] : 36 y/o female, has been struggling with her weight since she was a child  [place] about our weight loss management program from a friend Reports struggling with irregular/ heavy menstrual cycles, not currently on birth control , not currently sexually active. ? 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 Reviewed lab results- elevated cholesterol, elevated LDL, elevated fasting insulin, HgBa1c 5.6%, Vit D slightly low. Called insurance- does not have coverage for weight loss medication. Pt is interested in self paying for contrave to help with cravings. Exercised at the gym 2 times last week on the bike x 30 mins.  8/26/21: Lost 10.5 lbs since starting the program. Dietary intake improved. Food Recall: B- oatmeal, L- salad with protein, D- rice, beans and vegetables. Taking contrave 4 tabs daily, reports that she skips on days that she is working, reports having side effects including nausea, constipation, insomnia and occasional vomiting. Reports 2-3 binge eating episodes throughout the week on days that she is working and doesn't take the medication. Binge eating episodes happen on high stress days. Not currently seeing a therapist. Exercises at the gym 1-3 times per week. Water intake adequate. Sleep inadequate.   9/23/21: Gained 3 lbs since tapering off of contrave. Reports resolution of side effects including-nausea,constipation and insomnia. Has been sleeping better. Water intake adequate. Able to eat well on days off- ex: B-oatmeal, L-yogurt, D-protein with vegetable. Has binge eating episodes 3-4 times per week on days that she is working- usually binges on sweets. Exercising 3-4 times per week- youtube videos, yoga & pilates. Denies calling therapist.    3/7/23: Gained weight since we last spoke, highest weight 305, has lost 10 lbs from dietary changes in the last 2 weeks. Continues to struggle with binge eating episodes due to emotional stress. Tried vyvanse and said it didn't help.   3/30/23: Has been overeating healthier foods . Continues to struggle with extreme hunger, denies feeling full after she finishes a meal, and has binge eating episodes due to emotional stress. Reports that she has struggled with obesity & hunger since she was a child.   5/1/23: Maintained weight. Tolerated wegovy 0.25 mg weekly. Ordering food out often- greek salad with grilled chicken,  or [place] food- small portion of white rice. Water intake- drinks a lot of tea and gets about 1-2L per day. Exercise- sees  2-3 times per week.   5/22/23: Lost 8 lbs. Taking 0.5 mg weekly, feels like it is starting to work better at this dose. Feels full faster. Had a death in the family so meal planning was off track, but getting back into a routine this week. Continues to see  2-3 times per week for exercise.   6/26/23:  Maintained weight. Taking wegovy 1 mg dose weekly, denies side effects. Hasn't been consistent with eating or exercising since her father is currently in the hospital/ sick.   7/24/23: Requested a telephone call since she is currently at Rehab with her dad. Out of state session done today only.Reports that she spends day at work, hospital or rehab. Hasn't been able to focus on eating well or exercise since she is eating take out most days. Tolerating wegovy 1.7 mg weekly. Ready for dose titration up to 2.4 mg. Plans on getting back on track with exercise and meal planning once her dad gets out of rehab- may be discharged at the end of this week. + Constipation- taking miralax   8/21/23: Lost 2 lbs. Eating a lot of take out food since she is busy with father's care.  to new normal, dad is now home,  Adjusting to new normal. Has been sick with the Rhinovirus with GI issues. Plans on getting back to the gym next week.

## 2023-08-21 NOTE — ASSESSMENT
[FreeTextEntry1] : Continue to focus on dietary modification- plans on starting to meal prep, given some ideas, sheet pan meals, making  Exercise- plans on getting back to the gym next week  Medication- continue wegovy 2.4 mg weekly  RTO in 1 month

## 2023-09-21 ENCOUNTER — APPOINTMENT (OUTPATIENT)
Dept: BARIATRICS/WEIGHT MGMT | Facility: CLINIC | Age: 38
End: 2023-09-21
Payer: COMMERCIAL

## 2023-09-21 VITALS — BODY MASS INDEX: 48.76 KG/M2 | WEIGHT: 265 LBS | HEIGHT: 62 IN

## 2023-09-21 PROCEDURE — 99212 OFFICE O/P EST SF 10 MIN: CPT | Mod: 95

## 2023-10-26 ENCOUNTER — APPOINTMENT (OUTPATIENT)
Dept: BARIATRICS/WEIGHT MGMT | Facility: CLINIC | Age: 38
End: 2023-10-26

## 2023-11-06 ENCOUNTER — APPOINTMENT (OUTPATIENT)
Dept: BARIATRICS/WEIGHT MGMT | Facility: CLINIC | Age: 38
End: 2023-11-06
Payer: COMMERCIAL

## 2023-11-06 VITALS — WEIGHT: 265 LBS | HEIGHT: 62 IN | BODY MASS INDEX: 48.76 KG/M2

## 2023-11-06 PROCEDURE — 99213 OFFICE O/P EST LOW 20 MIN: CPT | Mod: 95

## 2023-12-05 ENCOUNTER — APPOINTMENT (OUTPATIENT)
Dept: BARIATRICS/WEIGHT MGMT | Facility: CLINIC | Age: 38
End: 2023-12-05
Payer: COMMERCIAL

## 2023-12-05 VITALS — WEIGHT: 269 LBS | BODY MASS INDEX: 49.5 KG/M2 | HEIGHT: 62 IN

## 2023-12-05 PROCEDURE — 99212 OFFICE O/P EST SF 10 MIN: CPT | Mod: 95

## 2023-12-05 RX ORDER — SEMAGLUTIDE 1 MG/.5ML
1 INJECTION, SOLUTION SUBCUTANEOUS
Qty: 1 | Refills: 2 | Status: COMPLETED | COMMUNITY
Start: 2023-05-22 | End: 2023-12-05

## 2023-12-05 RX ORDER — SEMAGLUTIDE 1.7 MG/.75ML
1.7 INJECTION, SOLUTION SUBCUTANEOUS
Qty: 1 | Refills: 1 | Status: COMPLETED | COMMUNITY
Start: 2023-06-26 | End: 2023-12-05

## 2023-12-05 RX ORDER — SEMAGLUTIDE 0.5 MG/.5ML
0.5 INJECTION, SOLUTION SUBCUTANEOUS
Qty: 1 | Refills: 0 | Status: COMPLETED | COMMUNITY
Start: 2023-05-01 | End: 2023-12-05

## 2023-12-05 RX ORDER — SEMAGLUTIDE 2.4 MG/.75ML
2.4 INJECTION, SOLUTION SUBCUTANEOUS
Qty: 1 | Refills: 2 | Status: COMPLETED | COMMUNITY
Start: 2023-07-24 | End: 2023-12-05

## 2023-12-05 RX ORDER — SEMAGLUTIDE 2.4 MG/.75ML
2.4 INJECTION, SOLUTION SUBCUTANEOUS
Qty: 3 | Refills: 1 | Status: COMPLETED | COMMUNITY
Start: 2023-09-21 | End: 2023-12-05

## 2024-01-11 ENCOUNTER — APPOINTMENT (OUTPATIENT)
Dept: BARIATRICS/WEIGHT MGMT | Facility: CLINIC | Age: 39
End: 2024-01-11
Payer: COMMERCIAL

## 2024-01-11 VITALS — WEIGHT: 255 LBS | BODY MASS INDEX: 46.64 KG/M2

## 2024-01-11 PROCEDURE — 99212 OFFICE O/P EST SF 10 MIN: CPT | Mod: 95

## 2024-01-11 NOTE — ASSESSMENT
[FreeTextEntry1] : Celebrated her success with losing 14 lbs over the last month  Continue to focus on eating better and rec increasing water intake  Exercise- continue working with  and doing home exercises Discussed the importance of self care with meal prepping/ planning and getting in exercise in schedule Educated on the fact that Bariatric surgery would have the best results for her health, but pt is not ready for surgery at this time, will continue to do the steps needed to get surgery but needs more time to make the decision Med- continue vyvanse and metformin 2 tabs daily  RTO in 1 month for f/u, plans on seeing Bariatric surgeon this month so we can do another TH apt next month

## 2024-01-11 NOTE — HISTORY OF PRESENT ILLNESS
[Home] : at home, [unfilled] , at the time of the visit. [Medical Office: ([facility])___] : at the medical office located in  [Verbal consent obtained from patient] : the patient, [unfilled] [FreeTextEntry1] : 34 y/o female, has been struggling with her weight since she was a child  [place] about our weight loss management program from a friend Reports struggling with irregular/ heavy menstrual cycles, not currently on birth control , not currently sexually active. ? 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 Reviewed lab results- elevated cholesterol, elevated LDL, elevated fasting insulin, HgBa1c 5.6%, Vit D slightly low. Called insurance- does not have coverage for weight loss medication. Pt is interested in self paying for contrave to help with cravings. Exercised at the gym 2 times last week on the bike x 30 mins.  8/26/21: Lost 10.5 lbs since starting the program. Dietary intake improved. Food Recall: B- oatmeal, L- salad with protein, D- rice, beans and vegetables. Taking contrave 4 tabs daily, reports that she skips on days that she is working, reports having side effects including nausea, constipation, insomnia and occasional vomiting. Reports 2-3 binge eating episodes throughout the week on days that she is working and doesn't take the medication. Binge eating episodes happen on high stress days. Not currently seeing a therapist. Exercises at the gym 1-3 times per week. Water intake adequate. Sleep inadequate.   9/23/21: Gained 3 lbs since tapering off of contrave. Reports resolution of side effects including-nausea,constipation and insomnia. Has been sleeping better. Water intake adequate. Able to eat well on days off- ex: B-oatmeal, L-yogurt, D-protein with vegetable. Has binge eating episodes 3-4 times per week on days that she is working- usually binges on sweets. Exercising 3-4 times per week- youtube videos, yoga & pilates. Denies calling therapist.    3/7/23: Gained weight since we last spoke, highest weight 305, has lost 10 lbs from dietary changes in the last 2 weeks. Continues to struggle with binge eating episodes due to emotional stress. Tried vyvanse and said it didn't help.   3/30/23: Has been overeating healthier foods . Continues to struggle with extreme hunger, denies feeling full after she finishes a meal, and has binge eating episodes due to emotional stress. Reports that she has struggled with obesity & hunger since she was a child.   5/1/23: Maintained weight. Tolerated wegovy 0.25 mg weekly. Ordering food out often- greek salad with grilled chicken,  or [place] food- small portion of white rice. Water intake- drinks a lot of tea and gets about 1-2L per day. Exercise- sees  2-3 times per week.   5/22/23: Lost 8 lbs. Taking 0.5 mg weekly, feels like it is starting to work better at this dose. Feels full faster. Had a death in the family so meal planning was off track, but getting back into a routine this week. Continues to see  2-3 times per week for exercise.   6/26/23:  Maintained weight. Taking wegovy 1 mg dose weekly, denies side effects. Hasn't been consistent with eating or exercising since her father is currently in the hospital/ sick.   7/24/23: Requested a telephone call since she is currently at Rehab with her dad. Out of state session done today only.Reports that she spends day at work, hospital or rehab. Hasn't been able to focus on eating well or exercise since she is eating take out most days. Tolerating wegovy 1.7 mg weekly. Ready for dose titration up to 2.4 mg. Plans on getting back on track with exercise and meal planning once her dad gets out of rehab- may be discharged at the end of this week. + Constipation- taking miralax   8/21/23: Lost 2 lbs. Eating a lot of take out food since she is busy with father's care.  to new normal, dad is now home,  Adjusting to new normal. Has been sick with the Rhinovirus with GI issues. Plans on getting back to the gym next week.   9/21/23: Reports doing better this month, but frustrated that she hasn't lost more weight. Eating healthier and preparing meals, plus going to the gym for exercise. Continues to take wegovy 2.4 mg weekly, feels that it wears off for the last 2 days and she feels herself going back to binge eating on those days.   11/6/23: Hasn't been able to get vyvnase. Also, wegovy 2.4 mg weekly now on backorder, last dose taken on Saturday. Food recall- Air fried fish with sweet potato, limiting take out. Continues to struggle with binge eating.   12/5/23: Increased weight, since discontinuing wegovy and celebrating Thanksgiving. Not eating a lot lately. Has been taking vyvanse and metformin daily, which she finds helpful. Started taking 50 mcg levothyroxine due to elevated TSH. Plans on seeing endocrine to follow this.  1/11/24: Lost 14 lbs. Taking vyvanse and metformin and has been eating better overall. Mostly eating soups since she currently has COVID. Decreased seeing parents to 3 x per week. Water intake - inadequate currently. Has been exercising with  2 x per week and doing Pilates classes at home. Would normally check BP at this visit- but pt is currently sick with COVID.

## 2024-02-06 ENCOUNTER — APPOINTMENT (OUTPATIENT)
Dept: BARIATRICS/WEIGHT MGMT | Facility: CLINIC | Age: 39
End: 2024-02-06
Payer: COMMERCIAL

## 2024-02-06 VITALS — BODY MASS INDEX: 47.55 KG/M2 | WEIGHT: 260 LBS

## 2024-02-06 PROCEDURE — 99213 OFFICE O/P EST LOW 20 MIN: CPT

## 2024-02-06 NOTE — ASSESSMENT
[FreeTextEntry1] : Continue metformin max dose of 2,000 mg daily, increased dose of vyvanse to 50 mg daily to help with extreme hunger Sent genetic testresults to pt via email, instructed to call genetic specialist, if more information is needed will refer to [facility] genetics Continue to focus on healthy lifestyle Plans on seeing bariatic surgeon this month and will have BP check there.  RTO In 1month for f/u

## 2024-02-06 NOTE — HISTORY OF PRESENT ILLNESS
[Home] : at home, [unfilled] , at the time of the visit. [Other Location: e.g. Home (Enter Location, City,State)___] : at [unfilled] [FreeTextEntry1] : 34 y/o female, has been struggling with her weight since she was a child  [place] about our weight loss management program from a friend Reports struggling with irregular/ heavy menstrual cycles, not currently on birth control , not currently sexually active. ? 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 Reviewed lab results- elevated cholesterol, elevated LDL, elevated fasting insulin, HgBa1c 5.6%, Vit D slightly low. Called insurance- does not have coverage for weight loss medication. Pt is interested in self paying for contrave to help with cravings. Exercised at the gym 2 times last week on the bike x 30 mins.  8/26/21: Lost 10.5 lbs since starting the program. Dietary intake improved. Food Recall: B- oatmeal, L- salad with protein, D- rice, beans and vegetables. Taking contrave 4 tabs daily, reports that she skips on days that she is working, reports having side effects including nausea, constipation, insomnia and occasional vomiting. Reports 2-3 binge eating episodes throughout the week on days that she is working and doesn't take the medication. Binge eating episodes happen on high stress days. Not currently seeing a therapist. Exercises at the gym 1-3 times per week. Water intake adequate. Sleep inadequate.   9/23/21: Gained 3 lbs since tapering off of contrave. Reports resolution of side effects including-nausea,constipation and insomnia. Has been sleeping better. Water intake adequate. Able to eat well on days off- ex: B-oatmeal, L-yogurt, D-protein with vegetable. Has binge eating episodes 3-4 times per week on days that she is working- usually binges on sweets. Exercising 3-4 times per week- youtube videos, yoga & pilates. Denies calling therapist.    3/7/23: Gained weight since we last spoke, highest weight 305, has lost 10 lbs from dietary changes in the last 2 weeks. Continues to struggle with binge eating episodes due to emotional stress. Tried vyvanse and said it didn't help.   3/30/23: Has been overeating healthier foods . Continues to struggle with extreme hunger, denies feeling full after she finishes a meal, and has binge eating episodes due to emotional stress. Reports that she has struggled with obesity & hunger since she was a child.   5/1/23: Maintained weight. Tolerated wegovy 0.25 mg weekly. Ordering food out often- greek salad with grilled chicken,  or [place] food- small portion of white rice. Water intake- drinks a lot of tea and gets about 1-2L per day. Exercise- sees  2-3 times per week.   5/22/23: Lost 8 lbs. Taking 0.5 mg weekly, feels like it is starting to work better at this dose. Feels full faster. Had a death in the family so meal planning was off track, but getting back into a routine this week. Continues to see  2-3 times per week for exercise.   6/26/23:  Maintained weight. Taking wegovy 1 mg dose weekly, denies side effects. Hasn't been consistent with eating or exercising since her father is currently in the hospital/ sick.   7/24/23: Requested a telephone call since she is currently at Rehab with her dad. Out of state session done today only.Reports that she spends day at work, hospital or rehab. Hasn't been able to focus on eating well or exercise since she is eating take out most days. Tolerating wegovy 1.7 mg weekly. Ready for dose titration up to 2.4 mg. Plans on getting back on track with exercise and meal planning once her dad gets out of rehab- may be discharged at the end of this week. + Constipation- taking miralax   8/21/23: Lost 2 lbs. Eating a lot of take out food since she is busy with father's care.  to new normal, dad is now home,  Adjusting to new normal. Has been sick with the Rhinovirus with GI issues. Plans on getting back to the gym next week.   9/21/23: Reports doing better this month, but frustrated that she hasn't lost more weight. Eating healthier and preparing meals, plus going to the gym for exercise. Continues to take wegovy 2.4 mg weekly, feels that it wears off for the last 2 days and she feels herself going back to binge eating on those days.   11/6/23: Hasn't been able to get vyvnase. Also, wegovy 2.4 mg weekly now on backorder, last dose taken on Saturday. Food recall- Air fried fish with sweet potato, limiting take out. Continues to struggle with binge eating.   12/5/23: Increased weight, since discontinuing wegovy and celebrating Thanksgiving. Not eating a lot lately. Has been taking vyvanse and metformin daily, which she finds helpful. Started taking 50 mcg levothyroxine due to elevated TSH. Plans on seeing endocrine to follow this.  1/11/24: Lost 14 lbs. Taking vyvanse and metformin and has been eating better overall. Mostly eating soups since she currently has COVID. Decreased seeing parents to 3 x per week. Water intake - inadequate currently. Has been exercising with  2 x per week and doing Pilates classes at home. Would normally check BP at this visit- but pt is currently sick with COVID.   2/6/24: Feels that hunger is out of control lately. Ate spinach, fish, and rice for breakfast and still feels hungry. Took synthroid, at 5:30, vyvanse at 6, and metformin with breakfast.  Denies added stress. Hasn't had menstrual cycle in 2 months. Getting in about 8-10,000 steps most days.  Goes to the gym 2 times per week. Drinking adequate water intake daily. Requests to review genetic test.

## 2024-02-09 ENCOUNTER — RX RENEWAL (OUTPATIENT)
Age: 39
End: 2024-02-09

## 2024-02-26 ENCOUNTER — APPOINTMENT (OUTPATIENT)
Dept: SURGERY | Facility: CLINIC | Age: 39
End: 2024-02-26
Payer: COMMERCIAL

## 2024-02-26 VITALS
BODY MASS INDEX: 47.72 KG/M2 | OXYGEN SATURATION: 99 % | HEIGHT: 62 IN | WEIGHT: 259.31 LBS | RESPIRATION RATE: 18 BRPM | SYSTOLIC BLOOD PRESSURE: 141 MMHG | HEART RATE: 73 BPM | TEMPERATURE: 97.5 F | DIASTOLIC BLOOD PRESSURE: 89 MMHG

## 2024-02-26 DIAGNOSIS — E66.01 MORBID (SEVERE) OBESITY DUE TO EXCESS CALORIES: ICD-10-CM

## 2024-02-26 DIAGNOSIS — J45.909 UNSPECIFIED ASTHMA, UNCOMPLICATED: ICD-10-CM

## 2024-02-26 PROCEDURE — 99204 OFFICE O/P NEW MOD 45 MIN: CPT

## 2024-02-26 PROCEDURE — 99214 OFFICE O/P EST MOD 30 MIN: CPT

## 2024-02-26 NOTE — HISTORY OF PRESENT ILLNESS
[de-identified] : Caity is a 37-year-old female who presents for 2nd weigh-in status post initial bariatric surgery consultation on April 7, 2023.  38-year-old female 5 foot 2 259 pounds with a BMI of 47.5 she is back for reconsult for sleeve gastrectomy to me.  She was last seen May 1, 2023 her weight at that time was 288 pounds she would like to be considered for sleeve gastrectomy.  She has been on multiple diet programs in the past all with no permanent success.

## 2024-02-26 NOTE — ASSESSMENT
[FreeTextEntry1] : 38-year-old female 5 foot 2 259 pounds with a BMI of 47.5 she would like to be considered for sleeve gastrectomy I believe she would be an excellent candidate she will undergo her usual medical nutritional and psychological workup attend a preoperative support group meeting and return for second office visit once all these are completed the risk benefits and expectations were discussed at length with the patient all of her questions were answered

## 2024-02-26 NOTE — REASON FOR VISIT
[Follow-Up Visit] : a follow-up visit for [Morbid Obesity (BMI>40)] : morbid obesity (bmi>40) [FreeTextEntry2] : Morbid obesity reconsult

## 2024-03-18 ENCOUNTER — APPOINTMENT (OUTPATIENT)
Dept: SURGERY | Facility: CLINIC | Age: 39
End: 2024-03-18

## 2024-04-04 ENCOUNTER — APPOINTMENT (OUTPATIENT)
Dept: BARIATRICS/WEIGHT MGMT | Facility: CLINIC | Age: 39
End: 2024-04-04
Payer: COMMERCIAL

## 2024-04-04 VITALS — WEIGHT: 255 LBS | HEIGHT: 62 IN | BODY MASS INDEX: 46.93 KG/M2

## 2024-04-04 DIAGNOSIS — E03.9 HYPOTHYROIDISM, UNSPECIFIED: ICD-10-CM

## 2024-04-04 DIAGNOSIS — E78.00 PURE HYPERCHOLESTEROLEMIA, UNSPECIFIED: ICD-10-CM

## 2024-04-04 PROCEDURE — 99212 OFFICE O/P EST SF 10 MIN: CPT

## 2024-04-04 PROCEDURE — G2211 COMPLEX E/M VISIT ADD ON: CPT | Mod: NC,1L

## 2024-04-04 NOTE — HISTORY OF PRESENT ILLNESS
[Home] : at home, [unfilled] , at the time of the visit. [Medical Office: ([facility])___] : at the medical office located in  [Verbal consent obtained from patient] : the patient, [unfilled] [FreeTextEntry1] : 34 y/o female, has been struggling with her weight since she was a child  [place] about our weight loss management program from a friend Reports struggling with irregular/ heavy menstrual cycles, not currently on birth control , not currently sexually active. ? 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 Reviewed lab results- elevated cholesterol, elevated LDL, elevated fasting insulin, HgBa1c 5.6%, Vit D slightly low. Called insurance- does not have coverage for weight loss medication. Pt is interested in self paying for contrave to help with cravings. Exercised at the gym 2 times last week on the bike x 30 mins.  8/26/21: Lost 10.5 lbs since starting the program. Dietary intake improved. Food Recall: B- oatmeal, L- salad with protein, D- rice, beans and vegetables. Taking contrave 4 tabs daily, reports that she skips on days that she is working, reports having side effects including nausea, constipation, insomnia and occasional vomiting. Reports 2-3 binge eating episodes throughout the week on days that she is working and doesn't take the medication. Binge eating episodes happen on high stress days. Not currently seeing a therapist. Exercises at the gym 1-3 times per week. Water intake adequate. Sleep inadequate.   9/23/21: Gained 3 lbs since tapering off of contrave. Reports resolution of side effects including-nausea,constipation and insomnia. Has been sleeping better. Water intake adequate. Able to eat well on days off- ex: B-oatmeal, L-yogurt, D-protein with vegetable. Has binge eating episodes 3-4 times per week on days that she is working- usually binges on sweets. Exercising 3-4 times per week- youtube videos, yoga & pilates. Denies calling therapist.    3/7/23: Gained weight since we last spoke, highest weight 305, has lost 10 lbs from dietary changes in the last 2 weeks. Continues to struggle with binge eating episodes due to emotional stress. Tried vyvanse and said it didn't help.    3/30/23: Has been overeating healthier foods . Continues to struggle with extreme hunger, denies feeling full after she finishes a meal, and has binge eating episodes due to emotional stress. Reports that she has struggled with obesity & hunger since she was a child.   5/1/23: Maintained weight. Tolerated wegovy 0.25 mg weekly. Ordering food out often- greek salad with grilled chicken,  or [place] food- small portion of white rice. Water intake- drinks a lot of tea and gets about 1-2L per day. Exercise- sees  2-3 times per week.   5/22/23: Lost 8 lbs. Taking 0.5 mg weekly, feels like it is starting to work better at this dose. Feels full faster. Had a death in the family so meal planning was off track, but getting back into a routine this week. Continues to see  2-3 times per week for exercise.   6/26/23:  Maintained weight. Taking wegovy 1 mg dose weekly, denies side effects. Hasn't been consistent with eating or exercising since her father is currently in the hospital/ sick.   7/24/23: Requested a telephone call since she is currently at Rehab with her dad. Out of state session done today only.Reports that she spends day at work, hospital or rehab. Hasn't been able to focus on eating well or exercise since she is eating take out most days. Tolerating wegovy 1.7 mg weekly. Ready for dose titration up to 2.4 mg. Plans on getting back on track with exercise and meal planning once her dad gets out of rehab- may be discharged at the end of this week. + Constipation- taking miralax   8/21/23: Lost 2 lbs. Eating a lot of take out food since she is busy with father's care.  to new normal, dad is now home,  Adjusting to new normal. Has been sick with the Rhinovirus with GI issues. Plans on getting back to the gym next week.   9/21/23: Reports doing better this month, but frustrated that she hasn't lost more weight. Eating healthier and preparing meals, plus going to the gym for exercise. Continues to take wegovy 2.4 mg weekly, feels that it wears off for the last 2 days and she feels herself going back to binge eating on those days.   11/6/23: Hasn't been able to get vyvnase. Also, wegovy 2.4 mg weekly now on backorder, last dose taken on Saturday. Food recall- Air fried fish with sweet potato, limiting take out. Continues to struggle with binge eating.   12/5/23: Increased weight, since discontinuing wegovy and celebrating Thanksgiving. Not eating a lot lately. Has been taking vyvanse and metformin daily, which she finds helpful. Started taking 50 mcg levothyroxine due to elevated TSH. Plans on seeing endocrine to follow this.  1/11/24: Lost 14 lbs. Taking vyvanse and metformin and has been eating better overall. Mostly eating soups since she currently has COVID. Decreased seeing parents to 3 x per week. Water intake - inadequate currently. Has been exercising with  2 x per week and doing Pilates classes at home. Would normally check BP at this visit- but pt is currently sick with COVID.   2/6/24: Feels that hunger is out of control lately. Ate spinach, fish, and rice for breakfast and still feels hungry. Took synthroid, at 5:30, vyvanse at 6, and metformin with breakfast.  Denies added stress. Hasn't had menstrual cycle in 2 months. Getting in about 8-10,000 steps most days.  Goes to the gym 2 times per week. Drinking adequate water intake daily. Requests to review genetic test.   4/4/24: Has been taking increase vyvanse dose and feels that it is helpful with binge eating episodes, only issue is that pharmacy is not able to get her adequate medicine due to a shortage. Changing over to the food delivery to help reduce take out-ordered Optivia, plans on starting within next month . Going to the gym 2 times per week. Goal to inc to 3 x per week with going to the gym. Increasing water intake.

## 2024-05-09 ENCOUNTER — APPOINTMENT (OUTPATIENT)
Dept: BARIATRICS/WEIGHT MGMT | Facility: CLINIC | Age: 39
End: 2024-05-09
Payer: COMMERCIAL

## 2024-05-09 DIAGNOSIS — E66.01 MORBID (SEVERE) OBESITY DUE TO EXCESS CALORIES: ICD-10-CM

## 2024-05-09 PROCEDURE — 99442: CPT

## 2024-05-09 RX ORDER — SEMAGLUTIDE 0.25 MG/.5ML
0.25 INJECTION, SOLUTION SUBCUTANEOUS
Qty: 1 | Refills: 0 | Status: ACTIVE | COMMUNITY
Start: 2024-05-09 | End: 1900-01-01

## 2024-05-09 NOTE — HISTORY OF PRESENT ILLNESS
[Home] : at home, [unfilled] , at the time of the visit. [Medical Office: ([facility])___] : at the medical office located in  [Verbal consent obtained from patient] : the patient, [unfilled] [FreeTextEntry1] : 36 y/o female, has been struggling with her weight since she was a child  White about our weight loss management program from a friend Reports struggling with irregular/ heavy menstrual cycles, not currently on birth control , not currently sexually active. ? 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 Reviewed lab results- elevated cholesterol, elevated LDL, elevated fasting insulin, HgBa1c 5.6%, Vit D slightly low. Called insurance- does not have coverage for weight loss medication. Pt is interested in self paying for contrave to help with cravings. Exercised at the gym 2 times last week on the bike x 30 mins.  8/26/21: Lost 10.5 lbs since starting the program. Dietary intake improved. Food Recall: B- oatmeal, L- salad with protein, D- rice, beans and vegetables. Taking contrave 4 tabs daily, reports that she skips on days that she is working, reports having side effects including nausea, constipation, insomnia and occasional vomiting. Reports 2-3 binge eating episodes throughout the week on days that she is working and doesn't take the medication. Binge eating episodes happen on high stress days. Not currently seeing a therapist. Exercises at the gym 1-3 times per week. Water intake adequate. Sleep inadequate.   9/23/21: Gained 3 lbs since tapering off of contrave. Reports resolution of side effects including-nausea,constipation and insomnia. Has been sleeping better. Water intake adequate. Able to eat well on days off- ex: B-oatmeal, L-yogurt, D-protein with vegetable. Has binge eating episodes 3-4 times per week on days that she is working- usually binges on sweets. Exercising 3-4 times per week- youtube videos, yoga & pilates. Denies calling therapist.    3/7/23: Gained weight since we last spoke, highest weight 305, has lost 10 lbs from dietary changes in the last 2 weeks. Continues to struggle with binge eating episodes due to emotional stress. Tried vyvanse and said it didn't help.    3/30/23: Has been overeating healthier foods . Continues to struggle with extreme hunger, denies feeling full after she finishes a meal, and has binge eating episodes due to emotional stress. Reports that she has struggled with obesity & hunger since she was a child.   5/1/23: Maintained weight. Tolerated wegovy 0.25 mg weekly. Ordering food out often- greek salad with grilled chicken, [place] or [place] food- small portion of white rice. Water intake- drinks a lot of tea and gets about 1-2L per day. Exercise- sees  2-3 times per week.   5/22/23: Lost 8 lbs. Taking 0.5 mg weekly, feels like it is starting to work better at this dose. Feels full faster. Had a death in the family so meal planning was off track, but getting back into a routine this week. Continues to see  2-3 times per week for exercise.   6/26/23:  Maintained weight. Taking wegovy 1 mg dose weekly, denies side effects. Hasn't been consistent with eating or exercising since her father is currently in the hospital/ sick.   7/24/23: Requested a telephone call since she is currently at Rehab with her dad. Out of state session done today only.Reports that she spends day at work, hospital or rehab. Hasn't been able to focus on eating well or exercise since she is eating take out most days. Tolerating wegovy 1.7 mg weekly. Ready for dose titration up to 2.4 mg. Plans on getting back on track with exercise and meal planning once her dad gets out of rehab- may be discharged at the end of this week. + Constipation- taking miralax   8/21/23: Lost 2 lbs. Eating a lot of take out food since she is busy with father's care.  to new normal, dad is now home,  Adjusting to new normal. Has been sick with the Rhinovirus with GI issues. Plans on getting back to the gym next week.   9/21/23: Reports doing better this month, but frustrated that she hasn't lost more weight. Eating healthier and preparing meals, plus going to the gym for exercise. Continues to take wegovy 2.4 mg weekly, feels that it wears off for the last 2 days and she feels herself going back to binge eating on those days.   11/6/23: Hasn't been able to get vyvnase. Also, wegovy 2.4 mg weekly now on backorder, last dose taken on Saturday. Food recall- Air fried fish with sweet potato, limiting take out. Continues to struggle with binge eating.   12/5/23: Increased weight, since discontinuing wegovy and celebrating Thanksgiving. Not eating a lot lately. Has been taking vyvanse and metformin daily, which she finds helpful. Started taking 50 mcg levothyroxine due to elevated TSH. Plans on seeing endocrine to follow this.  1/11/24: Lost 14 lbs. Taking vyvanse and metformin and has been eating better overall. Mostly eating soups since she currently has COVID. Decreased seeing parents to 3 x per week. Water intake - inadequate currently. Has been exercising with  2 x per week and doing Pilates classes at home. Would normally check BP at this visit- but pt is currently sick with COVID.   2/6/24: Feels that hunger is out of control lately. Ate spinach, fish, and rice for breakfast and still feels hungry. Took synthroid, at 5:30, vyvanse at 6, and metformin with breakfast.  Denies added stress. Hasn't had menstrual cycle in 2 months. Getting in about 8-10,000 steps most days.  Goes to the gym 2 times per week. Drinking adequate water intake daily. Requests to review genetic test.   4/4/24: Has been taking increase vyvanse dose and feels that it is helpful with binge eating episodes, only issue is that pharmacy is not able to get her adequate medicine due to a shortage. Changing over to the food delivery to help reduce take out-ordered [place], plans on starting within next month. Going to the gym 2 times per week. Goal to inc to 3 x per week with going to the gym. Increasing water intake.   5/9/24: Started seeing old therapist for stress. Vyvanse does help, has been off for a week and has been dealing with increased stress. Had recent physical exam, reports that lab results were WNL. Exercise- struggling to get to the gym, but walking at park when she can. Water intake- inadequate, has a gallon water bottle that she plans on using.

## 2024-05-09 NOTE — ASSESSMENT
[FreeTextEntry1] : Dietary goal- start filling 1 gallon water job daily to help encourage drinking more water overall  [place]  Meds- continue metformin & vyvanse daily, re-incorporate wegovy 0.25 mg weekly once egg freezing process is complete  RTO in 1 month for f/u

## 2024-05-29 ENCOUNTER — APPOINTMENT (OUTPATIENT)
Dept: ORTHOPEDIC SURGERY | Facility: CLINIC | Age: 39
End: 2024-05-29
Payer: OTHER GOVERNMENT

## 2024-05-29 DIAGNOSIS — M75.42 IMPINGEMENT SYNDROME OF LEFT SHOULDER: ICD-10-CM

## 2024-05-29 DIAGNOSIS — S43.432A SUPERIOR GLENOID LABRUM LESION OF LEFT SHOULDER, INITIAL ENCOUNTER: ICD-10-CM

## 2024-05-29 DIAGNOSIS — M75.22 BICIPITAL TENDINITIS, LEFT SHOULDER: ICD-10-CM

## 2024-05-29 PROCEDURE — 99204 OFFICE O/P NEW MOD 45 MIN: CPT

## 2024-05-29 NOTE — WORK
[Sprain/Strain] : sprain/strain [Torn Ligament/Tendon/Muscle] : torn ligament, tendon or muscle [Was the competent medical cause of the injury] : was the competent medical cause of the injury [Are consistent with the injury] : are consistent with the injury [Severe Partial] : severe partial

## 2024-05-29 NOTE — PHYSICAL EXAM
[de-identified] : Constitutional: The general appearance of the patient is well developed, well nourished, no deformities and well groomed. Normal   Gait: Gait and function is as follows: normal gait.   Skin: Head and neck visualized skin is normal. Left upper extremity visualized skin is normal. Right upper extremity visualized skin is normal. Thoracic Skin of the thoracic spine shows visualized skin is normal.   Cardiovascular: palpable radial pulse bilaterally, good capillary refill in digits of the bilateral upper extremities and no temperature or color changes in the bilateral upper extremities.   Lymphatic: Normal Palpation of lymph nodes in the cervical.   Neurologic: fine motor control in the bilateral upper extremities is intact. Deep Tendon Reflexes in Upper and Lower Extremities Negative Rivera's in the bilateral upper extremities. The patient is oriented to time, place and person. Sensation to light touch intact in the bilateral upper extremities. Mood and Affect is normal.   Right Shoulder: Inspection of the shoulder/upper arm is as follows: There is mild pain with range of motion of the shoulder   Left Shoulder: Inspection of the shoulder/upper arm is as follows: no scapula winging, no biceps deformity and no AC joint deformity. Palpation of the shoulder/upper arm is as follows: There is tenderness at the proximal biceps tendon. Range of motion of the shoulder is as follows: Pain with internal rotation, external rotation, abduction and forward flexion. Strength of the shoulder is as follows: Supraspinatus 4/5. External Rotation 4/5. Internal Rotation 4/5. Deltoid 5/5 Ligament Stability and Special Tests of the shoulder is as follows: Neer test is positive. Omalley' test is positive. Speed's test is positive.   Neck:   Inspection / Palpation of the cervical spine is as follows: mild paracervical tenderness. Range of motion of the cervical spine is as follows: moderately decreased range of motion of the cervical spine. Stability testing for the cervical spine is as follows Stable range of motion.   Back, including spine: Inspection / Palpation of the thoracic/lumbar spine is as follows: There is a full, pain free, stable range of motion of the thoracic spine with a normal tone and not tenderness to palpation..

## 2024-05-29 NOTE — HISTORY OF PRESENT ILLNESS
[de-identified] : This is a 39 yo female presenting to the office c/o ongoing left shoulder pain from a work related injury on 4/27/24.  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 Pt was trying to change the bed when she heard a pop. Pain is described as constant, severe in quality. Currently pain is 7/10 and non-radiating. Patient reports pain has been getting progressively worse. Pain is worse at night. Overall pain does improve with rest and ice. Patient denies any numbness or tingling. Pt is currently working full duty.

## 2024-05-29 NOTE — DISCUSSION/SUMMARY
[de-identified] : LUE: TTP LHBT, pain with bear hug and belly press +Speeds referred to biceps, + Obriens Pain and 90/90 ER   This is a 37 yo female presenting to the office c/o ongoing left shoulder pain from a work related injury on 4/27/24.  Outside x-rays are unremarkable Maximum point of tenderness on LHBT on exam Exam consistent with injury to long head of biceps tendon MRI to further evaluate rotator cuff Based on the patients history and physical exam findings, I am concerned about the possibility of a full-thickness rotator cuff tear. The patient has pain and subjective weakness consistent with this diagnosis. Therefore, I recommend an MRI to evaluate for a rotator cuff tear. This will also aid in evaluating for injury to the biceps labral complex and for any signs of impingement. The patient will follow-up after MRI to discuss further treatment options. Follow up 2 weeks    (1) We discussed a comprehensive treatment plans that included a prescription management plan involving the use of prescription strength medications to include Ibuprofen 600-800 mg TID, versus 500-650 mg Tylenol. We also discussed prescribing topical diclofenac (Voltaren gel) as well as once daily Meloxicam 15 mg. (2) The patient has More Than One chronic injuries/illnesses as outlined, discussed, and documented by ICD 10 codes listed, as well as the HPI and Plan section. There is a moderate risk of morbidity with further treatment, especially from use of prescription strength medications and possible side effects of these medications which include upset stomach and cardiac/renal issues with long term use were discussed. (3) I recommended that the patient follow-up with their medical physician to discuss any significant specific potential issues with long term use such as interactions with current medications or with exacerbation of underlying medical morbidities.   Attestation: I, Kenia Seymour , attest that this documentation has been prepared under the direction and in the presence of Provider Brandon Doherty MD. The documentation recorded by the scribe, in my presence, accurately reflects the service I personally performed, and the decisions made by me with my edits as appropriate. Brandon Doherty MD

## 2024-06-06 RX ORDER — LISDEXAMFETAMINE 50 MG/1
50 CAPSULE ORAL
Qty: 30 | Refills: 0 | Status: ACTIVE | COMMUNITY
Start: 2023-09-21 | End: 1900-01-01

## 2024-06-10 ENCOUNTER — APPOINTMENT (OUTPATIENT)
Dept: BARIATRICS/WEIGHT MGMT | Facility: CLINIC | Age: 39
End: 2024-06-10
Payer: COMMERCIAL

## 2024-06-10 ENCOUNTER — RX RENEWAL (OUTPATIENT)
Age: 39
End: 2024-06-10

## 2024-06-10 VITALS — HEIGHT: 62 IN | BODY MASS INDEX: 47.84 KG/M2 | WEIGHT: 260 LBS

## 2024-06-10 DIAGNOSIS — E66.01 MORBID (SEVERE) OBESITY DUE TO EXCESS CALORIES: ICD-10-CM

## 2024-06-10 DIAGNOSIS — E88.819 INSULIN RESISTANCE, UNSPECIFIED: ICD-10-CM

## 2024-06-10 DIAGNOSIS — F50.81 BINGE EATING DISORDER: ICD-10-CM

## 2024-06-10 DIAGNOSIS — N92.6 IRREGULAR MENSTRUATION, UNSPECIFIED: ICD-10-CM

## 2024-06-10 PROCEDURE — G2211 COMPLEX E/M VISIT ADD ON: CPT | Mod: NC

## 2024-06-10 PROCEDURE — 99212 OFFICE O/P EST SF 10 MIN: CPT

## 2024-06-10 RX ORDER — METFORMIN ER 500 MG 500 MG/1
500 TABLET ORAL
Qty: 120 | Refills: 1 | Status: ACTIVE | COMMUNITY
Start: 2023-12-05 | End: 1900-01-01

## 2024-06-10 NOTE — ASSESSMENT
[FreeTextEntry1] : Continue to focus on healthy lifestyle Rec talking with ortho to see if PT is indicated at this time as it could be helpful with continuing exercise regimen but with a focus on helping the pain instead of increasing discomfort Med- continue vyvanse and metformin now RTO in 1- 2 months for f/u

## 2024-06-10 NOTE — HISTORY OF PRESENT ILLNESS
[Home] : at home, [unfilled] , at the time of the visit. [Other Location: e.g. Home (Enter Location, City,State)___] : at [unfilled] [Verbal consent obtained from patient] : the patient, [unfilled] [FreeTextEntry1] : 34 y/o female, has been struggling with her weight since she was a child  [place] about our weight loss management program from a friend Reports struggling with irregular/ heavy menstrual cycles, not currently on birth control , not currently sexually active. ? 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 Reviewed lab results- elevated cholesterol, elevated LDL, elevated fasting insulin, HgBa1c 5.6%, Vit D slightly low. Called insurance- does not have coverage for weight loss medication. Pt is interested in self paying for contrave to help with cravings. Exercised at the gym 2 times last week on the bike x 30 mins.  8/26/21: Lost 10.5 lbs since starting the program. Dietary intake improved. Food Recall: B- oatmeal, L- salad with protein, D- rice, beans and vegetables. Taking contrave 4 tabs daily, reports that she skips on days that she is working, reports having side effects including nausea, constipation, insomnia and occasional vomiting. Reports 2-3 binge eating episodes throughout the week on days that she is working and doesn't take the medication. Binge eating episodes happen on high stress days. Not currently seeing a therapist. Exercises at the gym 1-3 times per week. Water intake adequate. Sleep inadequate.   9/23/21: Gained 3 lbs since tapering off of contrave. Reports resolution of side effects including-nausea,constipation and insomnia. Has been sleeping better. Water intake adequate. Able to eat well on days off- ex: B-oatmeal, L-yogurt, D-protein with vegetable. Has binge eating episodes 3-4 times per week on days that she is working- usually binges on sweets. Exercising 3-4 times per week- youtube videos, yoga & pilates. Denies calling therapist.    3/7/23: Gained weight since we last spoke, highest weight 305, has lost 10 lbs from dietary changes in the last 2 weeks. Continues to struggle with binge eating episodes due to emotional stress. Tried vyvanse and said it didn't help.    3/30/23: Has been overeating healthier foods . Continues to struggle with extreme hunger, denies feeling full after she finishes a meal, and has binge eating episodes due to emotional stress. Reports that she has struggled with obesity & hunger since she was a child.   5/1/23: Maintained weight. Tolerated wegovy 0.25 mg weekly. Ordering food out often- greek salad with grilled chicken,  or [place] food- small portion of white rice. Water intake- drinks a lot of tea and gets about 1-2L per day. Exercise- sees  2-3 times per week.   5/22/23: Lost 8 lbs. Taking 0.5 mg weekly, feels like it is starting to work better at this dose. Feels full faster. Had a death in the family so meal planning was off track, but getting back into a routine this week. Continues to see  2-3 times per week for exercise.   6/26/23:  Maintained weight. Taking wegovy 1 mg dose weekly, denies side effects. Hasn't been consistent with eating or exercising since her father is currently in the hospital/ sick.   7/24/23: Requested a telephone call since she is currently at Rehab with her dad. Out of state session done today only.Reports that she spends day at work, hospital or rehab. Hasn't been able to focus on eating well or exercise since she is eating take out most days. Tolerating wegovy 1.7 mg weekly. Ready for dose titration up to 2.4 mg. Plans on getting back on track with exercise and meal planning once her dad gets out of rehab- may be discharged at the end of this week. + Constipation- taking miralax   8/21/23: Lost 2 lbs. Eating a lot of take out food since she is busy with father's care.  to new normal, dad is now home,  Adjusting to new normal. Has been sick with the Rhinovirus with GI issues. Plans on getting back to the gym next week.   9/21/23: Reports doing better this month, but frustrated that she hasn't lost more weight. Eating healthier and preparing meals, plus going to the gym for exercise. Continues to take wegovy 2.4 mg weekly, feels that it wears off for the last 2 days and she feels herself going back to binge eating on those days.   11/6/23: Hasn't been able to get vyvnase. Also, wegovy 2.4 mg weekly now on backorder, last dose taken on Saturday. Food recall- Air fried fish with sweet potato, limiting take out. Continues to struggle with binge eating.   12/5/23: Increased weight, since discontinuing wegovy and celebrating Thanksgiving. Not eating a lot lately. Has been taking vyvanse and metformin daily, which she finds helpful. Started taking 50 mcg levothyroxine due to elevated TSH. Plans on seeing endocrine to follow this.  1/11/24: Lost 14 lbs. Taking vyvanse and metformin and has been eating better overall. Mostly eating soups since she currently has COVID. Decreased seeing parents to 3 x per week. Water intake - inadequate currently. Has been exercising with  2 x per week and doing Pilates classes at home. Would normally check BP at this visit- but pt is currently sick with COVID.   2/6/24: Feels that hunger is out of control lately. Ate spinach, fish, and rice for breakfast and still feels hungry. Took synthroid, at 5:30, vyvanse at 6, and metformin with breakfast.  Denies added stress. Hasn't had menstrual cycle in 2 months. Getting in about 8-10,000 steps most days.  Goes to the gym 2 times per week. Drinking adequate water intake daily. Requests to review genetic test.   4/4/24: Has been taking increase vyvanse dose and feels that it is helpful with binge eating episodes, only issue is that pharmacy is not able to get her adequate medicine due to a shortage. Changing over to the food delivery to help reduce take out-ordered [place], plans on starting within next month. Going to the gym 2 times per week. Goal to inc to 3 x per week with going to the gym. Increasing water intake.   5/9/24: Started seeing old therapist for stress. Vyvanse does help, has been off for a week and has been dealing with increased stress. Had recent physical exam, reports that lab results were WNL. Exercise- struggling to get to the gym, but walking at park when she can. Water intake- inadequate, has a gallon water bottle that she plans on using.   6/10/24: Continues to get eggs frozen, going to gym 3 x per week and eating healthy dietary intake. Struggling with knee and shoulder pain with current exercise regimen. Drinking plenty of water and coconut water to help with egg freezing. Doing another cycle next month for egg freezing so will continue with metformin and vyvanse only at this time

## 2024-06-12 ENCOUNTER — APPOINTMENT (OUTPATIENT)
Dept: ORTHOPEDIC SURGERY | Facility: CLINIC | Age: 39
End: 2024-06-12

## 2024-06-17 ENCOUNTER — APPOINTMENT (OUTPATIENT)
Dept: MRI IMAGING | Facility: CLINIC | Age: 39
End: 2024-06-17
Payer: OTHER GOVERNMENT

## 2024-06-17 PROCEDURE — 73221 MRI JOINT UPR EXTREM W/O DYE: CPT | Mod: LT

## 2024-06-26 ENCOUNTER — APPOINTMENT (OUTPATIENT)
Dept: ORTHOPEDIC SURGERY | Facility: CLINIC | Age: 39
End: 2024-06-26

## 2024-07-10 ENCOUNTER — APPOINTMENT (OUTPATIENT)
Dept: ORTHOPEDIC SURGERY | Facility: CLINIC | Age: 39
End: 2024-07-10
Payer: OTHER GOVERNMENT

## 2024-07-10 DIAGNOSIS — M75.42 IMPINGEMENT SYNDROME OF LEFT SHOULDER: ICD-10-CM

## 2024-07-10 DIAGNOSIS — M75.22 BICIPITAL TENDINITIS, LEFT SHOULDER: ICD-10-CM

## 2024-07-10 DIAGNOSIS — S43.432A SUPERIOR GLENOID LABRUM LESION OF LEFT SHOULDER, INITIAL ENCOUNTER: ICD-10-CM

## 2024-07-10 PROCEDURE — 20611 DRAIN/INJ JOINT/BURSA W/US: CPT | Mod: LT

## 2024-07-10 PROCEDURE — 99214 OFFICE O/P EST MOD 30 MIN: CPT | Mod: 25

## 2024-08-28 ENCOUNTER — APPOINTMENT (OUTPATIENT)
Dept: ORTHOPEDIC SURGERY | Facility: CLINIC | Age: 39
End: 2024-08-28

## 2024-08-28 ENCOUNTER — APPOINTMENT (OUTPATIENT)
Dept: BARIATRICS/WEIGHT MGMT | Facility: CLINIC | Age: 39
End: 2024-08-28

## 2024-09-03 ENCOUNTER — RX RENEWAL (OUTPATIENT)
Age: 39
End: 2024-09-03

## 2024-09-17 ENCOUNTER — APPOINTMENT (OUTPATIENT)
Dept: BARIATRICS/WEIGHT MGMT | Facility: CLINIC | Age: 39
End: 2024-09-17
Payer: COMMERCIAL

## 2024-09-17 VITALS — HEIGHT: 62 IN | WEIGHT: 266 LBS | BODY MASS INDEX: 48.95 KG/M2

## 2024-09-17 DIAGNOSIS — E78.00 PURE HYPERCHOLESTEROLEMIA, UNSPECIFIED: ICD-10-CM

## 2024-09-17 DIAGNOSIS — E03.9 HYPOTHYROIDISM, UNSPECIFIED: ICD-10-CM

## 2024-09-17 DIAGNOSIS — F50.81 BINGE EATING DISORDER: ICD-10-CM

## 2024-09-17 DIAGNOSIS — E66.01 MORBID (SEVERE) OBESITY DUE TO EXCESS CALORIES: ICD-10-CM

## 2024-09-17 DIAGNOSIS — E88.819 INSULIN RESISTANCE, UNSPECIFIED: ICD-10-CM

## 2024-09-17 PROCEDURE — G2211 COMPLEX E/M VISIT ADD ON: CPT | Mod: NC

## 2024-09-17 PROCEDURE — 99212 OFFICE O/P EST SF 10 MIN: CPT

## 2024-09-17 NOTE — HISTORY OF PRESENT ILLNESS
[Home] : at home, [unfilled] , at the time of the visit. [Other Location: e.g. Home (Enter Location, City,State)___] : at [unfilled] [Verbal consent obtained from patient] : the patient, [unfilled] [FreeTextEntry1] : 36 y/o female, has been struggling with her weight since she was a child  [place] about our weight loss management program from a friend Reports struggling with irregular/ heavy menstrual cycles, not currently on birth control , not currently sexually active. ? 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 Reviewed lab results- elevated cholesterol, elevated LDL, elevated fasting insulin, HgBa1c 5.6%, Vit D slightly low. Called insurance- does not have coverage for weight loss medication. Pt is interested in self paying for contrave to help with cravings. Exercised at the gym 2 times last week on the bike x 30 mins.  8/26/21: Lost 10.5 lbs since starting the program. Dietary intake improved. Food Recall: B- oatmeal, L- salad with protein, D- rice, beans and vegetables. Taking contrave 4 tabs daily, reports that she skips on days that she is working, reports having side effects including nausea, constipation, insomnia and occasional vomiting. Reports 2-3 binge eating episodes throughout the week on days that she is working and doesn't take the medication. Binge eating episodes happen on high stress days. Not currently seeing a therapist. Exercises at the gym 1-3 times per week. Water intake adequate. Sleep inadequate.   9/23/21: Gained 3 lbs since tapering off of contrave. Reports resolution of side effects including-nausea,constipation and insomnia. Has been sleeping better. Water intake adequate. Able to eat well on days off- ex: B-oatmeal, L-yogurt, D-protein with vegetable. Has binge eating episodes 3-4 times per week on days that she is working- usually binges on sweets. Exercising 3-4 times per week- youtube videos, yoga & pilates. Denies calling therapist.    3/7/23: Gained weight since we last spoke, highest weight 305, has lost 10 lbs from dietary changes in the last 2 weeks. Continues to struggle with binge eating episodes due to emotional stress. Tried vyvanse and said it didn't help.    3/30/23: Has been overeating healthier foods . Continues to struggle with extreme hunger, denies feeling full after she finishes a meal, and has binge eating episodes due to emotional stress. Reports that she has struggled with obesity & hunger since she was a child.   5/1/23: Maintained weight. Tolerated wegovy 0.25 mg weekly. Ordering food out often- greek salad with grilled chicken,  or [place] food- small portion of white rice. Water intake- drinks a lot of tea and gets about 1-2L per day. Exercise- sees  2-3 times per week.   5/22/23: Lost 8 lbs. Taking 0.5 mg weekly, feels like it is starting to work better at this dose. Feels full faster. Had a death in the family so meal planning was off track, but getting back into a routine this week. Continues to see  2-3 times per week for exercise.   6/26/23:  Maintained weight. Taking wegovy 1 mg dose weekly, denies side effects. Hasn't been consistent with eating or exercising since her father is currently in the hospital/ sick.   7/24/23: Requested a telephone call since she is currently at Rehab with her dad. Out of state session done today only.Reports that she spends day at work, hospital or rehab. Hasn't been able to focus on eating well or exercise since she is eating take out most days. Tolerating wegovy 1.7 mg weekly. Ready for dose titration up to 2.4 mg. Plans on getting back on track with exercise and meal planning once her dad gets out of rehab- may be discharged at the end of this week. + Constipation- taking miralax   8/21/23: Lost 2 lbs. Eating a lot of take out food since she is busy with father's care.  to new normal, dad is now home,  Adjusting to new normal. Has been sick with the Rhinovirus with GI issues. Plans on getting back to the gym next week.   9/21/23: Reports doing better this month, but frustrated that she hasn't lost more weight. Eating healthier and preparing meals, plus going to the gym for exercise. Continues to take wegovy 2.4 mg weekly, feels that it wears off for the last 2 days and she feels herself going back to binge eating on those days.   11/6/23: Hasn't been able to get vyvnase. Also, wegovy 2.4 mg weekly now on backorder, last dose taken on Saturday. Food recall- Air fried fish with sweet potato, limiting take out. Continues to struggle with binge eating.   12/5/23: Increased weight, since discontinuing wegovy and celebrating Thanksgiving. Not eating a lot lately. Has been taking vyvanse and metformin daily, which she finds helpful. Started taking 50 mcg levothyroxine due to elevated TSH. Plans on seeing endocrine to follow this.  1/11/24: Lost 14 lbs. Taking vyvanse and metformin and has been eating better overall. Mostly eating soups since she currently has COVID. Decreased seeing parents to 3 x per week. Water intake - inadequate currently. Has been exercising with  2 x per week and doing Pilates classes at home. Would normally check BP at this visit- but pt is currently sick with COVID.   2/6/24: Feels that hunger is out of control lately. Ate spinach, fish, and rice for breakfast and still feels hungry. Took synthroid, at 5:30, vyvanse at 6, and metformin with breakfast.  Denies added stress. Hasn't had menstrual cycle in 2 months. Getting in about 8-10,000 steps most days.  Goes to the gym 2 times per week. Drinking adequate water intake daily. Requests to review genetic test.   4/4/24: Has been taking increase vyvanse dose and feels that it is helpful with binge eating episodes, only issue is that pharmacy is not able to get her adequate medicine due to a shortage. Changing over to the food delivery to help reduce take out-ordered Optivia, plans on starting within next month. Going to the gym 2 times per week. Goal to inc to 3 x per week with going to the gym. Increasing water intake.   5/9/24: Started seeing old therapist for stress. Vyvanse does help, has been off for a week and has been dealing with increased stress. Had recent physical exam, reports that lab results were WNL. Exercise- struggling to get to the gym, but walking at park when she can. Water intake- inadequate, has a gallon water bottle that she plans on using.   6/10/24: Continues to get eggs frozen, going to gym 3 x per week and eating healthy dietary intake. Struggling with knee and shoulder pain with current exercise regimen. Drinking plenty of water and coconut water to help with egg freezing. Doing another cycle next month for egg freezing so will continue with metformin and vyvanse only at this time   9/17/24: Feels increased hunger since being off vyvanse over the last month. Last BP check 138/71. Drinking a gallon of water most days. Eating healthier food choices, decreased cravings of sugary foods and notices decreased binge episodes while taking vyvanse. Continues to hold wegovy, awaiting egg retreval procedure. Struggling with inc stress related to mother's new diagnosis of uterine cancer.

## 2024-09-17 NOTE — ASSESSMENT
[FreeTextEntry1] : - Summary : Restart Vyvanse since she reports no side effects and it previously helped control her appetite. She will also be started on Wegovy after egg retrieval procedure to help with the food noise and the hunger. Caity should continue her Metformin and continue her healthy diet and exercise routine. Caity should ensure to keep her water intake consistent Follow-up appointment scheduled for October 21st. - Plan : - Restart Vyvanse - Continue Metformin -Restart Wegovy after egg retrieval procedure - Maintain dietary regimen and exercise routine - Follow-up appointment on October 21st

## 2024-09-18 ENCOUNTER — APPOINTMENT (OUTPATIENT)
Dept: ORTHOPEDIC SURGERY | Facility: CLINIC | Age: 39
End: 2024-09-18

## 2024-09-27 ENCOUNTER — APPOINTMENT (OUTPATIENT)
Dept: ORTHOPEDIC SURGERY | Facility: CLINIC | Age: 39
End: 2024-09-27

## 2024-10-21 ENCOUNTER — APPOINTMENT (OUTPATIENT)
Dept: BARIATRICS/WEIGHT MGMT | Facility: CLINIC | Age: 39
End: 2024-10-21
Payer: COMMERCIAL

## 2024-10-21 VITALS — BODY MASS INDEX: 48.58 KG/M2 | HEIGHT: 62 IN | WEIGHT: 264 LBS

## 2024-10-21 DIAGNOSIS — F50.819 BINGE EATING DISORDER, UNSPECIFIED: ICD-10-CM

## 2024-10-21 DIAGNOSIS — E78.00 PURE HYPERCHOLESTEROLEMIA, UNSPECIFIED: ICD-10-CM

## 2024-10-21 DIAGNOSIS — E66.01 MORBID (SEVERE) OBESITY DUE TO EXCESS CALORIES: ICD-10-CM

## 2024-10-21 PROCEDURE — G2211 COMPLEX E/M VISIT ADD ON: CPT | Mod: NC

## 2024-10-21 PROCEDURE — 99213 OFFICE O/P EST LOW 20 MIN: CPT

## 2024-10-23 ENCOUNTER — RX RENEWAL (OUTPATIENT)
Age: 39
End: 2024-10-23

## 2024-11-06 ENCOUNTER — APPOINTMENT (OUTPATIENT)
Dept: ORTHOPEDIC SURGERY | Facility: CLINIC | Age: 39
End: 2024-11-06

## 2024-12-16 ENCOUNTER — APPOINTMENT (OUTPATIENT)
Dept: BARIATRICS/WEIGHT MGMT | Facility: CLINIC | Age: 39
End: 2024-12-16
Payer: COMMERCIAL

## 2024-12-16 VITALS — BODY MASS INDEX: 48.29 KG/M2 | WEIGHT: 264 LBS

## 2024-12-16 DIAGNOSIS — F50.819 BINGE EATING DISORDER, UNSPECIFIED: ICD-10-CM

## 2024-12-16 DIAGNOSIS — E03.9 HYPOTHYROIDISM, UNSPECIFIED: ICD-10-CM

## 2024-12-16 DIAGNOSIS — E66.01 MORBID (SEVERE) OBESITY DUE TO EXCESS CALORIES: ICD-10-CM

## 2024-12-16 DIAGNOSIS — E88.819 INSULIN RESISTANCE, UNSPECIFIED: ICD-10-CM

## 2024-12-16 PROCEDURE — 99213 OFFICE O/P EST LOW 20 MIN: CPT

## 2024-12-16 RX ORDER — SEMAGLUTIDE 0.5 MG/.5ML
0.5 INJECTION, SOLUTION SUBCUTANEOUS
Qty: 1 | Refills: 1 | Status: ACTIVE | COMMUNITY
Start: 2024-12-16 | End: 1900-01-01

## 2024-12-18 ENCOUNTER — RX RENEWAL (OUTPATIENT)
Age: 39
End: 2024-12-18

## 2025-01-23 ENCOUNTER — APPOINTMENT (OUTPATIENT)
Dept: BARIATRICS/WEIGHT MGMT | Facility: CLINIC | Age: 40
End: 2025-01-23
Payer: COMMERCIAL

## 2025-01-23 VITALS — BODY MASS INDEX: 47.92 KG/M2 | WEIGHT: 262 LBS

## 2025-01-23 DIAGNOSIS — E66.01 MORBID (SEVERE) OBESITY DUE TO EXCESS CALORIES: ICD-10-CM

## 2025-01-23 DIAGNOSIS — F50.819 BINGE EATING DISORDER, UNSPECIFIED: ICD-10-CM

## 2025-01-23 DIAGNOSIS — E78.00 PURE HYPERCHOLESTEROLEMIA, UNSPECIFIED: ICD-10-CM

## 2025-01-23 PROCEDURE — 99214 OFFICE O/P EST MOD 30 MIN: CPT | Mod: 95

## 2025-04-07 ENCOUNTER — APPOINTMENT (OUTPATIENT)
Dept: BARIATRICS/WEIGHT MGMT | Facility: CLINIC | Age: 40
End: 2025-04-07

## 2025-04-07 ENCOUNTER — APPOINTMENT (OUTPATIENT)
Dept: BARIATRICS/WEIGHT MGMT | Facility: CLINIC | Age: 40
End: 2025-04-07
Payer: COMMERCIAL

## 2025-04-07 VITALS — BODY MASS INDEX: 46.64 KG/M2 | WEIGHT: 255 LBS

## 2025-04-07 DIAGNOSIS — N92.6 IRREGULAR MENSTRUATION, UNSPECIFIED: ICD-10-CM

## 2025-04-07 DIAGNOSIS — F50.819 BINGE EATING DISORDER, UNSPECIFIED: ICD-10-CM

## 2025-04-07 DIAGNOSIS — E78.00 PURE HYPERCHOLESTEROLEMIA, UNSPECIFIED: ICD-10-CM

## 2025-04-07 DIAGNOSIS — E66.01 MORBID (SEVERE) OBESITY DUE TO EXCESS CALORIES: ICD-10-CM

## 2025-04-07 PROCEDURE — 99214 OFFICE O/P EST MOD 30 MIN: CPT | Mod: 95

## 2025-05-29 NOTE — HISTORY OF PRESENT ILLNESS
Patient called wanting to advise you that she had major bleeding and clotting from the urinary tract that started on 05/19/25 it does come and go . She did see her pcp and her pcp sent her to have some test done.  She is going to have a procedure -Cystography on Monday 06/02/25. She is wanting to know if she is needing to do anything on the rheumatology end before her procedure . Please Advise. Thanks    [Home] : at home, [unfilled] , at the time of the visit. [Other Location: e.g. Home (Enter Location, City,State)___] : at [unfilled] [Verbal consent obtained from patient] : the patient, [unfilled] [FreeTextEntry1] : 34 y/o female, has been struggling with her weight since she was a child \par [place] about our weight loss management program from a friend\par Reports struggling with irregular/ heavy menstrual cycles, not currently on birth control , not currently sexually active. ? 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 Reviewed lab results- elevated cholesterol, elevated LDL, elevated fasting insulin, HgBa1c 5.6%, Vit D slightly low. Called insurance- does not have coverage for weight loss medication. Pt is interested in self paying for contrave to help with cravings. Exercised at the gym 2 times last week on the bike x 30 mins.\par \par 8/26/21: Lost 10.5 lbs since starting the program. Dietary intake improved. Food Recall: B- oatmeal, L- salad with protein, D- rice, beans and vegetables. Taking contrave 4 tabs daily, reports that she skips on days that she is working, reports having side effects including nausea, constipation, insomnia and occasional vomiting. Reports 2-3 binge eating episodes throughout the week on days that she is working and doesn't take the medication. Binge eating episodes happen on high stress days. Not currently seeing a therapist. Exercises at the gym 1-3 times per week. Water intake adequate. Sleep inadequate. \par \par 9/23/21: Gained 3 lbs since tapering off of contrave. Reports resolution of side effects including-nausea,constipation and insomnia. Has been sleeping better. Water intake adequate. Able to eat well on days off- ex: B-oatmeal, L-yogurt, D-protein with vegetable. Has binge eating episodes 3-4 times per week on days that she is working- usually binges on sweets. Exercising 3-4 times per week- youtube videos, yoga & pilates. Denies calling therapist.  \par \par 3/7/23: Gained weight since we last spoke, highest weight 305, has lost 10 lbs from dietary changes in the last 2 weeks. Continues to struggle with binge eating episodes due to emotional stress. Tried vyvanse and said it didn’t help. \par \par 3/30/23: Has been overeating healthier foods . Continues to struggle with extreme hunger, denies feeling full after she finishes a meal, and has binge eating episodes due to emotional stress. Reports that she has struggled with obesity & hunger since she was a child. \par \par 5/1/23: Maintained weight. Tolerated wegovy 0.25 mg weekly. Ordering food out often- greek salad with grilled chicken,  or [place] food- small portion of white rice. Water intake- drinks a lot of tea and gets about 1-2L per day. Exercise- sees  2-3 times per week. \par \par 5/22/23: Lost 8 lbs. Taking 0.5 mg weekly, feels like it is starting to work better at this dose. Feels full faster. Had a death in the family so meal planning was off track, but getting back into a routine this week. Continues to see  2-3 times per week for exercise. \par \par 6/26/23:  Maintained weight. Taking wegovy 1 mg dose weekly, denies side effects. Hasn't been consistent with eating or exercising since her father is currently in the hospital/ sick.

## 2025-06-09 ENCOUNTER — APPOINTMENT (OUTPATIENT)
Dept: BARIATRICS/WEIGHT MGMT | Facility: CLINIC | Age: 40
End: 2025-06-09
Payer: COMMERCIAL

## 2025-06-09 VITALS — WEIGHT: 252 LBS | BODY MASS INDEX: 46.09 KG/M2

## 2025-06-09 PROCEDURE — 99213 OFFICE O/P EST LOW 20 MIN: CPT | Mod: 95

## 2025-06-09 RX ORDER — LISDEXAMFETAMINE DIMESYLATE 60 MG/1
60 TABLET, CHEWABLE ORAL
Qty: 30 | Refills: 0 | Status: ACTIVE | COMMUNITY
Start: 2025-06-09 | End: 1900-01-01

## 2025-06-15 ENCOUNTER — EMERGENCY (EMERGENCY)
Facility: HOSPITAL | Age: 40
LOS: 1 days | End: 2025-06-15
Attending: EMERGENCY MEDICINE | Admitting: EMERGENCY MEDICINE
Payer: COMMERCIAL

## 2025-06-15 VITALS
WEIGHT: 244.05 LBS | DIASTOLIC BLOOD PRESSURE: 77 MMHG | OXYGEN SATURATION: 99 % | HEIGHT: 62 IN | TEMPERATURE: 99 F | RESPIRATION RATE: 20 BRPM | HEART RATE: 95 BPM | SYSTOLIC BLOOD PRESSURE: 139 MMHG

## 2025-06-15 DIAGNOSIS — Z90.3 ACQUIRED ABSENCE OF STOMACH [PART OF]: Chronic | ICD-10-CM

## 2025-06-15 LAB
ALBUMIN SERPL ELPH-MCNC: 4.3 G/DL — SIGNIFICANT CHANGE UP (ref 3.3–5)
ALP SERPL-CCNC: 56 U/L — SIGNIFICANT CHANGE UP (ref 40–120)
ALT FLD-CCNC: 39 U/L — HIGH (ref 4–33)
ANION GAP SERPL CALC-SCNC: 19 MMOL/L — HIGH (ref 7–14)
APAP SERPL-MCNC: <10 UG/ML — LOW (ref 15–25)
APTT BLD: 31.7 SEC — SIGNIFICANT CHANGE UP (ref 26.1–36.8)
AST SERPL-CCNC: 35 U/L — HIGH (ref 4–32)
BASOPHILS # BLD AUTO: 0.02 K/UL — SIGNIFICANT CHANGE UP (ref 0–0.2)
BASOPHILS # BLD AUTO: 0.03 K/UL — SIGNIFICANT CHANGE UP (ref 0–0.2)
BASOPHILS NFR BLD AUTO: 0.2 % — SIGNIFICANT CHANGE UP (ref 0–2)
BASOPHILS NFR BLD AUTO: 0.3 % — SIGNIFICANT CHANGE UP (ref 0–2)
BILIRUB SERPL-MCNC: 0.4 MG/DL — SIGNIFICANT CHANGE UP (ref 0.2–1.2)
BLD GP AB SCN SERPL QL: NEGATIVE — SIGNIFICANT CHANGE UP
BLOOD GAS VENOUS COMPREHENSIVE RESULT: SIGNIFICANT CHANGE UP
BUN SERPL-MCNC: 15 MG/DL — SIGNIFICANT CHANGE UP (ref 7–23)
CALCIUM SERPL-MCNC: 9.1 MG/DL — SIGNIFICANT CHANGE UP (ref 8.4–10.5)
CHLORIDE SERPL-SCNC: 98 MMOL/L — SIGNIFICANT CHANGE UP (ref 98–107)
CO2 SERPL-SCNC: 21 MMOL/L — LOW (ref 22–31)
CREAT SERPL-MCNC: 0.55 MG/DL — SIGNIFICANT CHANGE UP (ref 0.5–1.3)
EGFR: 120 ML/MIN/1.73M2 — SIGNIFICANT CHANGE UP
EGFR: 120 ML/MIN/1.73M2 — SIGNIFICANT CHANGE UP
EOSINOPHIL # BLD AUTO: 0.03 K/UL — SIGNIFICANT CHANGE UP (ref 0–0.5)
EOSINOPHIL # BLD AUTO: 0.05 K/UL — SIGNIFICANT CHANGE UP (ref 0–0.5)
EOSINOPHIL NFR BLD AUTO: 0.3 % — SIGNIFICANT CHANGE UP (ref 0–6)
EOSINOPHIL NFR BLD AUTO: 0.4 % — SIGNIFICANT CHANGE UP (ref 0–6)
FLUAV AG NPH QL: SIGNIFICANT CHANGE UP
FLUBV AG NPH QL: SIGNIFICANT CHANGE UP
GLUCOSE SERPL-MCNC: 116 MG/DL — HIGH (ref 70–99)
HCG SERPL-ACNC: <1 MIU/ML — SIGNIFICANT CHANGE UP
HCT VFR BLD CALC: 32.2 % — LOW (ref 34.5–45)
HCT VFR BLD CALC: 35.7 % — SIGNIFICANT CHANGE UP (ref 34.5–45)
HGB BLD-MCNC: 10.6 G/DL — LOW (ref 11.5–15.5)
HGB BLD-MCNC: 12.1 G/DL — SIGNIFICANT CHANGE UP (ref 11.5–15.5)
IANC: 10.07 K/UL — HIGH (ref 1.8–7.4)
IANC: 9.62 K/UL — HIGH (ref 1.8–7.4)
IMM GRANULOCYTES NFR BLD AUTO: 0.3 % — SIGNIFICANT CHANGE UP (ref 0–0.9)
IMM GRANULOCYTES NFR BLD AUTO: 0.3 % — SIGNIFICANT CHANGE UP (ref 0–0.9)
INR BLD: 1.18 RATIO — HIGH (ref 0.85–1.16)
LIDOCAIN IGE QN: 36 U/L — SIGNIFICANT CHANGE UP (ref 7–60)
LYMPHOCYTES # BLD AUTO: 1.16 K/UL — SIGNIFICANT CHANGE UP (ref 1–3.3)
LYMPHOCYTES # BLD AUTO: 1.19 K/UL — SIGNIFICANT CHANGE UP (ref 1–3.3)
LYMPHOCYTES # BLD AUTO: 10.3 % — LOW (ref 13–44)
LYMPHOCYTES # BLD AUTO: 9.6 % — LOW (ref 13–44)
MCHC RBC-ENTMCNC: 27.1 PG — SIGNIFICANT CHANGE UP (ref 27–34)
MCHC RBC-ENTMCNC: 27.8 PG — SIGNIFICANT CHANGE UP (ref 27–34)
MCHC RBC-ENTMCNC: 32.9 G/DL — SIGNIFICANT CHANGE UP (ref 32–36)
MCHC RBC-ENTMCNC: 33.9 G/DL — SIGNIFICANT CHANGE UP (ref 32–36)
MCV RBC AUTO: 82.1 FL — SIGNIFICANT CHANGE UP (ref 80–100)
MCV RBC AUTO: 82.4 FL — SIGNIFICANT CHANGE UP (ref 80–100)
MONOCYTES # BLD AUTO: 0.64 K/UL — SIGNIFICANT CHANGE UP (ref 0–0.9)
MONOCYTES # BLD AUTO: 0.8 K/UL — SIGNIFICANT CHANGE UP (ref 0–0.9)
MONOCYTES NFR BLD AUTO: 5.5 % — SIGNIFICANT CHANGE UP (ref 2–14)
MONOCYTES NFR BLD AUTO: 6.6 % — SIGNIFICANT CHANGE UP (ref 2–14)
NEUTROPHILS # BLD AUTO: 10.07 K/UL — HIGH (ref 1.8–7.4)
NEUTROPHILS # BLD AUTO: 9.62 K/UL — HIGH (ref 1.8–7.4)
NEUTROPHILS NFR BLD AUTO: 82.9 % — HIGH (ref 43–77)
NEUTROPHILS NFR BLD AUTO: 83.3 % — HIGH (ref 43–77)
NRBC # BLD AUTO: 0 K/UL — SIGNIFICANT CHANGE UP (ref 0–0)
NRBC # BLD AUTO: 0 K/UL — SIGNIFICANT CHANGE UP (ref 0–0)
NRBC # FLD: 0 K/UL — SIGNIFICANT CHANGE UP (ref 0–0)
NRBC # FLD: 0 K/UL — SIGNIFICANT CHANGE UP (ref 0–0)
NRBC BLD AUTO-RTO: 0 /100 WBCS — SIGNIFICANT CHANGE UP (ref 0–0)
NRBC BLD AUTO-RTO: 0 /100 WBCS — SIGNIFICANT CHANGE UP (ref 0–0)
NT-PROBNP SERPL-SCNC: <36 PG/ML — SIGNIFICANT CHANGE UP
PLATELET # BLD AUTO: 302 K/UL — SIGNIFICANT CHANGE UP (ref 150–400)
PLATELET # BLD AUTO: 333 K/UL — SIGNIFICANT CHANGE UP (ref 150–400)
POTASSIUM SERPL-MCNC: 3.2 MMOL/L — LOW (ref 3.5–5.3)
POTASSIUM SERPL-SCNC: 3.2 MMOL/L — LOW (ref 3.5–5.3)
PROT SERPL-MCNC: 7.6 G/DL — SIGNIFICANT CHANGE UP (ref 6–8.3)
PROTHROM AB SERPL-ACNC: 13.7 SEC — HIGH (ref 9.9–13.4)
RBC # BLD: 3.91 M/UL — SIGNIFICANT CHANGE UP (ref 3.8–5.2)
RBC # BLD: 4.35 M/UL — SIGNIFICANT CHANGE UP (ref 3.8–5.2)
RBC # FLD: 13.4 % — SIGNIFICANT CHANGE UP (ref 10.3–14.5)
RBC # FLD: 13.6 % — SIGNIFICANT CHANGE UP (ref 10.3–14.5)
RH IG SCN BLD-IMP: POSITIVE — SIGNIFICANT CHANGE UP
RSV RNA NPH QL NAA+NON-PROBE: SIGNIFICANT CHANGE UP
SARS-COV-2 RNA SPEC QL NAA+PROBE: SIGNIFICANT CHANGE UP
SODIUM SERPL-SCNC: 138 MMOL/L — SIGNIFICANT CHANGE UP (ref 135–145)
SOURCE RESPIRATORY: SIGNIFICANT CHANGE UP
TROPONIN T, HIGH SENSITIVITY RESULT: <6 NG/L — SIGNIFICANT CHANGE UP
WBC # BLD: 11.55 K/UL — HIGH (ref 3.8–10.5)
WBC # BLD: 12.14 K/UL — HIGH (ref 3.8–10.5)
WBC # FLD AUTO: 11.55 K/UL — HIGH (ref 3.8–10.5)
WBC # FLD AUTO: 12.14 K/UL — HIGH (ref 3.8–10.5)

## 2025-06-15 PROCEDURE — 99285 EMERGENCY DEPT VISIT HI MDM: CPT

## 2025-06-15 PROCEDURE — 93010 ELECTROCARDIOGRAM REPORT: CPT

## 2025-06-15 PROCEDURE — 71275 CT ANGIOGRAPHY CHEST: CPT | Mod: 26

## 2025-06-15 PROCEDURE — 74177 CT ABD & PELVIS W/CONTRAST: CPT | Mod: 26

## 2025-06-15 RX ORDER — FENTANYL CITRATE-0.9 % NACL/PF 100MCG/2ML
50 SYRINGE (ML) INTRAVENOUS ONCE
Refills: 0 | Status: DISCONTINUED | OUTPATIENT
Start: 2025-06-15 | End: 2025-06-15

## 2025-06-15 RX ORDER — IOHEXOL 350 MG/ML
30 INJECTION, SOLUTION INTRAVENOUS ONCE
Refills: 0 | Status: COMPLETED | OUTPATIENT
Start: 2025-06-15 | End: 2025-06-15

## 2025-06-15 RX ORDER — LIDOCAINE HYDROCHLORIDE 20 MG/ML
1 JELLY TOPICAL ONCE
Refills: 0 | Status: COMPLETED | OUTPATIENT
Start: 2025-06-15 | End: 2025-06-15

## 2025-06-15 RX ORDER — METOCLOPRAMIDE HCL 10 MG
10 TABLET ORAL ONCE
Refills: 0 | Status: COMPLETED | OUTPATIENT
Start: 2025-06-15 | End: 2025-06-15

## 2025-06-15 RX ORDER — FOLIC ACID 1 MG/1
1 TABLET ORAL ONCE
Refills: 0 | Status: COMPLETED | OUTPATIENT
Start: 2025-06-15 | End: 2025-06-15

## 2025-06-15 RX ADMIN — Medication 50 MICROGRAM(S): at 20:28

## 2025-06-15 RX ADMIN — Medication 4 MILLIGRAM(S): at 17:40

## 2025-06-15 RX ADMIN — IOHEXOL 30 MILLILITER(S): 350 INJECTION, SOLUTION INTRAVENOUS at 17:54

## 2025-06-15 RX ADMIN — Medication 50 MICROGRAM(S): at 22:46

## 2025-06-15 RX ADMIN — Medication 10 MILLIGRAM(S): at 17:38

## 2025-06-15 RX ADMIN — FOLIC ACID 1 MILLIGRAM(S): 1 TABLET ORAL at 17:54

## 2025-06-15 RX ADMIN — Medication 100 MILLIGRAM(S): at 17:39

## 2025-06-15 RX ADMIN — Medication 1000 MILLILITER(S): at 17:39

## 2025-06-15 RX ADMIN — Medication 4 MILLIGRAM(S): at 18:49

## 2025-06-15 NOTE — ED PROVIDER NOTE - PROGRESS NOTE DETAILS
[facility] consulted no response from on call surgeon. contact information given for a call back  Pt found to have free blood in abdomen and hematomas at surgical sites Patient more comfortable after the fentany, spoke with surgeon  guarding findings on the CT abdomen pelvis.  States to stop the Lovenox and she just needs monitoring.  Patient has required several rounds of narcotics and would need to be observed however given the surgery was done at [facility] with transfer there he accepted the patient will call transfer center to make arrangements patient updated. CT chest no PE.  Status post sleeve gastrectomy with large perigastric heterogeneous hyperattenuation collection most consistent with hematomas.  No discrete evidence of active extravasation within limitations of CTA.  For resolving hemorrhagic ascites.  Patient updated.  She states that yesterday when she was walking she started coughing and felt "something popped" pending callback from her surgeon. Dr Hurtado  pending repeat cbc Transfer center contacted to transfer care to Sy Hurtado Transfer center contacted to transfer care to Sy Hurtado  Cell: [phone] [facility] accepted pt, will transfer. pt and mother aware. Complete transfer forms. Radiology unable to make a CD of our imaging until the AM when medical records opens. Transfer center informed Contacted radiology to try to obtain CDs of the CTs performed states that they are unable to get a copy of CD overnight.  Escalated to the nurse manager unfortunately unable to obtain CT today.  Transfer center updated.  Recommend send patient with copy of report.

## 2025-06-15 NOTE — ED ADULT NURSE NOTE - OBJECTIVE STATEMENT
38 yo F AAOx4 received to [facility] s/o gastric sleeve on 6/10 c/o n/v and chest pain now radiating to L shoulder, placed on CM showing NSR, #20 USIV placed to RAC by resident MD, + blood return, medicated as ordered, pending CT, pt and mother @ bedside given plan of care update

## 2025-06-15 NOTE — ED ADULT NURSE REASSESSMENT NOTE - NS ED NURSE REASSESS COMMENT FT1
RVP results and face sheet faxed to transfer center. CD of ct scan obtained and in pt chart. pt awaiting transfer to [facility]

## 2025-06-15 NOTE — ED ADULT TRIAGE NOTE - CHIEF COMPLAINT QUOTE
l upper chest ,clavicular area since this am. pain increases inspiration   .movement.  vomiting yesterday. nausea at present gastric sleeve surgery 6/10, presently taking lovenx. reports taking tylenol q 1 hr since 8am today

## 2025-06-15 NOTE — ED ADULT NURSE REASSESSMENT NOTE - NS ED NURSE REASSESS COMMENT FT1
report received from day shift ED RN. pt sitting up in chair, c/o continued 10/10 abdominal pain. pt assisted back in stretcher, placed back on cardiac monitor and continuous pulse ox, VSS. medicated as per orders, tolerated well. iv flushing well without complications, iv site WNL. additional blood work drawn and sent to lab. safety measures maintained, side rails up x2. mother at bedside

## 2025-06-15 NOTE — ED PROVIDER NOTE - ATTENDING CONTRIBUTION TO CARE
Attending Statement: I have personally seen and examined this patient. I have fully participated in the care of this patient. I have reviewed all pertinent clinical information, including history physical exam, plan and the Resident's note and agree except as noted  39-year-old female history of asthma no prior intubations or hospitalization in the ICU, status post gastric sleeve postop day 5 surgery done at [facility] by  brought in by EMS from home chief complaint of shortness of breath and pain to the left shoulder that started at 11 AM.  Endorsing dyspnea on exertion and shortness of breath no relief with albuterol.  Severe pain to the left shoulder.  Worse with inspiration.  No trauma. + abdominal pain in mid abdomen associated with nausea and nonbilious vomit.  Passing some gas.  No BM.            No fever no chills.  No urinary complaints.  Patient is on Lovenox postsurgery electrically.  No history of DVT or PE.  Vitals noted not tachycardic not tachypneic not hypoxic.  ANO x 3 but appears in pain and uncomfortable with taking a deep breath and talking to her history.  No obvious work of breathing.  Poor lung exam due to the pain.  But no wheezing noted.  Abdomen obese soft, rwith Steri-Strips in place in the upper abdomen tender to palpation of the mid right abdomen no overlying erythema fluctuance or discharge of Steri-Strips.  No pedal edema no calf tenderness bilaterally.  Tender to the left shoulder with no obvious redness swelling.  Plan EKG, cardiac monitor, labs, antiemetics, pain meds banana bag, CT angio rule out PE CT abdomen pelvis, will contact her surgeon at [facility].  Plan discussed with patient and mother at bedside.

## 2025-06-15 NOTE — ED PROVIDER NOTE - CLINICAL SUMMARY MEDICAL DECISION MAKING FREE TEXT BOX
Ronan Pickett, PGY3 - This is a 39-year-old female with past medical history of asthma no other medical problems, recent gastric sleeve surgery on 6/10 with Dr. Hurtado in [facility] presenting today for 1 day of nausea and vomiting and also left upper chest pain that is severe in nature and worsens with inspiration.  Has taken Tylenol without much relief.  No fever or chills.  No pain other wise except for epigastric and left upper chest pain.  No back pain.  No lower abdominal pain.  Has been passing gas normally.  Has been taking the vitamins as prescribed.  Has been taking Zofran as prescribed however nausea has not been controlled.  Vitals here within normal limits.  Physical exam shows patient in slight pain otherwise well-appearing and not in acute distress.  In no acute respiratory distress.  No focal neurodeficits.  Patient is alert and oriented x 4 moving extremities and following commands and answering questions appropriately.  No crackles or wheezing.  Abdomen soft however tender to palpation in the epigastric region.  No lower abdominal tenderness to palpation.  Postsurgical site is well-healed and without any induration.  Lower extremities without any swelling or pain to palpation.  Concern at this time for PE versus postop complication from gastric sleeve.  Will do labs troponin BNP.  Will do CTA of the chest to rule out PE.  Will do CT abdomen pelvis with oral and IV contrast for postop complication with epigastric pain.  Will do morphine for pain.  Will do oral contrast and normal saline and folic acid and thiamine.  Patient's doctors number for the office is [phone].  Disposition pending labs imaging and reassessment.

## 2025-06-16 VITALS
TEMPERATURE: 98 F | HEART RATE: 96 BPM | SYSTOLIC BLOOD PRESSURE: 162 MMHG | RESPIRATION RATE: 18 BRPM | DIASTOLIC BLOOD PRESSURE: 87 MMHG | OXYGEN SATURATION: 100 %

## 2025-07-07 ENCOUNTER — APPOINTMENT (OUTPATIENT)
Dept: BARIATRICS/WEIGHT MGMT | Facility: CLINIC | Age: 40
End: 2025-07-07

## 2025-08-25 ENCOUNTER — APPOINTMENT (OUTPATIENT)
Dept: BARIATRICS/WEIGHT MGMT | Facility: CLINIC | Age: 40
End: 2025-08-25